# Patient Record
Sex: MALE | Race: WHITE | NOT HISPANIC OR LATINO | URBAN - METROPOLITAN AREA
[De-identification: names, ages, dates, MRNs, and addresses within clinical notes are randomized per-mention and may not be internally consistent; named-entity substitution may affect disease eponyms.]

---

## 2022-11-03 ENCOUNTER — TELEPHONE (OUTPATIENT)
Dept: TRANSPLANT | Facility: CLINIC | Age: 42
End: 2022-11-03

## 2022-11-03 ENCOUNTER — TELEPHONE (OUTPATIENT)
Dept: TRANSPLANT | Facility: CLINIC | Age: 42
End: 2022-11-03
Payer: COMMERCIAL

## 2022-11-03 NOTE — TELEPHONE ENCOUNTER
Referral received from   Referring Provider: Alexi Leonardo MD   Phone: [phone]   Fax: [phone]       Patient with Cirrhosis unspecified  MELD 15  ICD-10:  k74.60   Referred for liver transplant for EVALUATION.    Referral completed and forwarded to [facility].          Insurance: [employer] [facility]   PRIMARY:   ID:YAQ  [id number]  Contact #     SECONDARY:   ID:  Contact #

## 2022-11-03 NOTE — TELEPHONE ENCOUNTER
----- Message from Rosaura Laureano sent at 11/3/2022  3:32 PM CDT -----    Hepatology referral received and scanned into media; pt chart sent to referral nurse for medical review.       Referring Provider: Alexi Leonardo MD   Phone: [phone]   Fax: [phone]        .

## 2022-11-07 ENCOUNTER — PATIENT MESSAGE (OUTPATIENT)
Dept: HEPATOLOGY | Facility: CLINIC | Age: 42
End: 2022-11-07
Payer: COMMERCIAL

## 2022-11-25 DIAGNOSIS — Z76.82 ORGAN TRANSPLANT CANDIDATE: ICD-10-CM

## 2022-11-25 DIAGNOSIS — K74.60 HEPATIC CIRRHOSIS, UNSPECIFIED HEPATIC CIRRHOSIS TYPE, UNSPECIFIED WHETHER ASCITES PRESENT: Primary | ICD-10-CM

## 2022-11-27 RX ORDER — HYDROCODONE BITARTRATE AND ACETAMINOPHEN 7.5; 325 MG/1; MG/1
1 TABLET ORAL EVERY 6 HOURS PRN
COMMUNITY
Start: 2022-11-18 | End: 2022-12-22 | Stop reason: ALTCHOICE

## 2022-11-27 RX ORDER — METFORMIN HYDROCHLORIDE 1000 MG/1
1000 TABLET ORAL 2 TIMES DAILY
COMMUNITY
Start: 2022-11-07

## 2022-11-27 RX ORDER — LACTULOSE 10 G/15ML
30 SOLUTION ORAL; RECTAL 2 TIMES DAILY
COMMUNITY
Start: 2022-11-07 | End: 2023-02-28

## 2022-11-27 RX ORDER — LOSARTAN POTASSIUM 50 MG/1
50 TABLET ORAL EVERY MORNING
COMMUNITY
Start: 2022-11-07

## 2022-11-27 RX ORDER — METOPROLOL TARTRATE 25 MG/1
12.5 TABLET, FILM COATED ORAL 2 TIMES DAILY
COMMUNITY
Start: 2022-11-03

## 2022-11-27 RX ORDER — ESCITALOPRAM OXALATE 20 MG/1
20 TABLET ORAL DAILY
COMMUNITY
Start: 2022-11-07

## 2022-11-27 RX ORDER — RIFAXIMIN 550 MG/1
550 TABLET ORAL 2 TIMES DAILY
COMMUNITY
Start: 2022-11-07

## 2022-11-27 RX ORDER — PANTOPRAZOLE SODIUM 40 MG/1
40 TABLET, DELAYED RELEASE ORAL 2 TIMES DAILY
COMMUNITY
Start: 2022-11-07

## 2022-11-27 RX ORDER — ESOMEPRAZOLE MAGNESIUM 40 MG/1
CAPSULE, DELAYED RELEASE ORAL EVERY MORNING
COMMUNITY
Start: 2022-10-04 | End: 2022-11-28

## 2022-11-27 RX ORDER — ROSUVASTATIN CALCIUM 5 MG/1
5 TABLET, COATED ORAL EVERY MORNING
COMMUNITY
Start: 2022-11-07

## 2022-11-27 RX ORDER — CARVEDILOL 3.12 MG/1
3.12 TABLET ORAL 2 TIMES DAILY
COMMUNITY
Start: 2022-10-24 | End: 2022-11-28

## 2022-11-28 ENCOUNTER — OFFICE VISIT (OUTPATIENT)
Dept: TRANSPLANT | Facility: CLINIC | Age: 42
End: 2022-11-28
Attending: INTERNAL MEDICINE
Payer: COMMERCIAL

## 2022-11-28 ENCOUNTER — LAB VISIT (OUTPATIENT)
Dept: LAB | Facility: HOSPITAL | Age: 42
End: 2022-11-28
Payer: COMMERCIAL

## 2022-11-28 ENCOUNTER — TELEPHONE (OUTPATIENT)
Dept: TRANSPLANT | Facility: CLINIC | Age: 42
End: 2022-11-28

## 2022-11-28 VITALS
TEMPERATURE: 97 F | HEART RATE: 80 BPM | OXYGEN SATURATION: 100 % | WEIGHT: 180.75 LBS | DIASTOLIC BLOOD PRESSURE: 58 MMHG | SYSTOLIC BLOOD PRESSURE: 119 MMHG | BODY MASS INDEX: 30.12 KG/M2 | HEIGHT: 65 IN | RESPIRATION RATE: 16 BRPM

## 2022-11-28 DIAGNOSIS — K75.81 LIVER CIRRHOSIS SECONDARY TO NASH: Chronic | ICD-10-CM

## 2022-11-28 DIAGNOSIS — K74.60 LIVER CIRRHOSIS SECONDARY TO NASH: Chronic | ICD-10-CM

## 2022-11-28 DIAGNOSIS — Z95.828 S/P TIPS (TRANSJUGULAR INTRAHEPATIC PORTOSYSTEMIC SHUNT): Chronic | ICD-10-CM

## 2022-11-28 DIAGNOSIS — K74.60 HEPATIC CIRRHOSIS, UNSPECIFIED HEPATIC CIRRHOSIS TYPE, UNSPECIFIED WHETHER ASCITES PRESENT: ICD-10-CM

## 2022-11-28 DIAGNOSIS — Z76.82 ORGAN TRANSPLANT CANDIDATE: ICD-10-CM

## 2022-11-28 DIAGNOSIS — I85.10 SECONDARY ESOPHAGEAL VARICES WITHOUT BLEEDING: Chronic | ICD-10-CM

## 2022-11-28 DIAGNOSIS — I86.4 GASTRIC VARICES: Chronic | ICD-10-CM

## 2022-11-28 LAB
A1 AG RBC QL: NORMAL
AFP SERPL-MCNC: <2 NG/ML (ref 0–8.4)
ALBUMIN SERPL BCP-MCNC: 3.6 G/DL (ref 3.5–5.2)
ALP SERPL-CCNC: 141 U/L (ref 55–135)
ALT SERPL W/O P-5'-P-CCNC: 13 U/L (ref 10–44)
AMPHET+METHAMPHET UR QL: NEGATIVE
ANION GAP SERPL CALC-SCNC: 8 MMOL/L (ref 8–16)
ANISOCYTOSIS BLD QL SMEAR: SLIGHT
AST SERPL-CCNC: 21 U/L (ref 10–40)
BARBITURATES UR QL SCN>200 NG/ML: NEGATIVE
BASOPHILS NFR BLD: 1 % (ref 0–1.9)
BENZODIAZ UR QL SCN>200 NG/ML: NEGATIVE
BILIRUB DIRECT SERPL-MCNC: 0.7 MG/DL (ref 0.1–0.3)
BILIRUB SERPL-MCNC: 1.6 MG/DL (ref 0.1–1)
BILIRUB UR QL STRIP: NEGATIVE
BUN SERPL-MCNC: 8 MG/DL (ref 6–20)
BZE UR QL SCN: NEGATIVE
CALCIUM SERPL-MCNC: 8.8 MG/DL (ref 8.7–10.5)
CANNABINOIDS UR QL SCN: NEGATIVE
CHLORIDE SERPL-SCNC: 110 MMOL/L (ref 95–110)
CLARITY UR REFRACT.AUTO: CLEAR
CO2 SERPL-SCNC: 25 MMOL/L (ref 23–29)
COLOR UR AUTO: YELLOW
CREAT SERPL-MCNC: 0.8 MG/DL (ref 0.5–1.4)
CREAT UR-MCNC: 132 MG/DL (ref 23–375)
DIFFERENTIAL METHOD: ABNORMAL
EOSINOPHIL NFR BLD: 0 % (ref 0–8)
ERYTHROCYTE [DISTWIDTH] IN BLOOD BY AUTOMATED COUNT: 15.5 % (ref 11.5–14.5)
EST. GFR  (NO RACE VARIABLE): >60 ML/MIN/1.73 M^2
ETHANOL UR-MCNC: <10 MG/DL
GGT SERPL-CCNC: 133 U/L (ref 8–55)
GLUCOSE SERPL-MCNC: 112 MG/DL (ref 70–110)
GLUCOSE UR QL STRIP: NEGATIVE
HAV IGG SER QL IA: REACTIVE
HBV CORE AB SERPL QL IA: NORMAL
HBV SURFACE AB SER-ACNC: <3 MIU/ML
HBV SURFACE AB SER-ACNC: NORMAL M[IU]/ML
HBV SURFACE AG SERPL QL IA: NORMAL
HCT VFR BLD AUTO: 27.1 % (ref 40–54)
HCV AB SERPL QL IA: NORMAL
HGB BLD-MCNC: 8.1 G/DL (ref 14–18)
HGB UR QL STRIP: NEGATIVE
HYPOCHROMIA BLD QL SMEAR: ABNORMAL
IMM GRANULOCYTES # BLD AUTO: ABNORMAL K/UL (ref 0–0.04)
IMM GRANULOCYTES NFR BLD AUTO: ABNORMAL % (ref 0–0.5)
INR PPP: 1.3 (ref 0.8–1.2)
KETONES UR QL STRIP: NEGATIVE
LEUKOCYTE ESTERASE UR QL STRIP: NEGATIVE
LYMPHOCYTES NFR BLD: 18 % (ref 18–48)
MCH RBC QN AUTO: 24.8 PG (ref 27–31)
MCHC RBC AUTO-ENTMCNC: 29.9 G/DL (ref 32–36)
MCV RBC AUTO: 83 FL (ref 82–98)
METHADONE UR QL SCN>300 NG/ML: NEGATIVE
MONOCYTES NFR BLD: 7 % (ref 4–15)
NEUTROPHILS NFR BLD: 74 % (ref 38–73)
NITRITE UR QL STRIP: NEGATIVE
NRBC BLD-RTO: 0 /100 WBC
OPIATES UR QL SCN: NEGATIVE
OVALOCYTES BLD QL SMEAR: ABNORMAL
PCP UR QL SCN>25 NG/ML: NEGATIVE
PH UR STRIP: 6 [PH] (ref 5–8)
PLATELET # BLD AUTO: 63 K/UL (ref 150–450)
PLATELET BLD QL SMEAR: ABNORMAL
PMV BLD AUTO: 11.2 FL (ref 9.2–12.9)
POIKILOCYTOSIS BLD QL SMEAR: SLIGHT
POLYCHROMASIA BLD QL SMEAR: ABNORMAL
POTASSIUM SERPL-SCNC: 4.2 MMOL/L (ref 3.5–5.1)
PROT SERPL-MCNC: 6.8 G/DL (ref 6–8.4)
PROT UR QL STRIP: NEGATIVE
PROTHROMBIN TIME: 13 SEC (ref 9–12.5)
RBC # BLD AUTO: 3.26 M/UL (ref 4.6–6.2)
SODIUM SERPL-SCNC: 143 MMOL/L (ref 136–145)
SP GR UR STRIP: 1.02 (ref 1–1.03)
TOXICOLOGY INFORMATION: NORMAL
URN SPEC COLLECT METH UR: NORMAL
WBC # BLD AUTO: 1.56 K/UL (ref 3.9–12.7)

## 2022-11-28 PROCEDURE — 80053 COMPREHEN METABOLIC PANEL: CPT | Mod: TXP | Performed by: INTERNAL MEDICINE

## 2022-11-28 PROCEDURE — 85027 COMPLETE CBC AUTOMATED: CPT | Mod: TXP | Performed by: INTERNAL MEDICINE

## 2022-11-28 PROCEDURE — 1159F PR MEDICATION LIST DOCUMENTED IN MEDICAL RECORD: ICD-10-PCS | Mod: CPTII,S$GLB,TXP, | Performed by: INTERNAL MEDICINE

## 2022-11-28 PROCEDURE — 3074F SYST BP LT 130 MM HG: CPT | Mod: CPTII,S$GLB,TXP, | Performed by: INTERNAL MEDICINE

## 2022-11-28 PROCEDURE — 82248 BILIRUBIN DIRECT: CPT | Mod: TXP | Performed by: INTERNAL MEDICINE

## 2022-11-28 PROCEDURE — 3078F PR MOST RECENT DIASTOLIC BLOOD PRESSURE < 80 MM HG: ICD-10-PCS | Mod: CPTII,S$GLB,TXP, | Performed by: INTERNAL MEDICINE

## 2022-11-28 PROCEDURE — 1160F PR REVIEW ALL MEDS BY PRESCRIBER/CLIN PHARMACIST DOCUMENTED: ICD-10-PCS | Mod: CPTII,S$GLB,TXP, | Performed by: INTERNAL MEDICINE

## 2022-11-28 PROCEDURE — 86850 RBC ANTIBODY SCREEN: CPT | Mod: 91,TXP | Performed by: INTERNAL MEDICINE

## 2022-11-28 PROCEDURE — 86978 RBC PRETREATMENT SERUM: CPT | Mod: 91,TXP | Performed by: INTERNAL MEDICINE

## 2022-11-28 PROCEDURE — 86905 BLOOD TYPING RBC ANTIGENS: CPT | Mod: TXP | Performed by: INTERNAL MEDICINE

## 2022-11-28 PROCEDURE — 85007 BL SMEAR W/DIFF WBC COUNT: CPT | Mod: TXP | Performed by: INTERNAL MEDICINE

## 2022-11-28 PROCEDURE — 80321 ALCOHOLS BIOMARKERS 1OR 2: CPT | Mod: TXP | Performed by: INTERNAL MEDICINE

## 2022-11-28 PROCEDURE — 4010F ACE/ARB THERAPY RXD/TAKEN: CPT | Mod: CPTII,S$GLB,TXP, | Performed by: INTERNAL MEDICINE

## 2022-11-28 PROCEDURE — 82977 ASSAY OF GGT: CPT | Mod: TXP | Performed by: INTERNAL MEDICINE

## 2022-11-28 PROCEDURE — 99999 PR PBB SHADOW E&M-EST. PATIENT-LVL IV: CPT | Mod: PBBFAC,TXP,, | Performed by: INTERNAL MEDICINE

## 2022-11-28 PROCEDURE — 86790 VIRUS ANTIBODY NOS: CPT | Mod: TXP | Performed by: INTERNAL MEDICINE

## 2022-11-28 PROCEDURE — 82105 ALPHA-FETOPROTEIN SERUM: CPT | Mod: TXP | Performed by: INTERNAL MEDICINE

## 2022-11-28 PROCEDURE — 1159F MED LIST DOCD IN RCRD: CPT | Mod: CPTII,S$GLB,TXP, | Performed by: INTERNAL MEDICINE

## 2022-11-28 PROCEDURE — 3008F PR BODY MASS INDEX (BMI) DOCUMENTED: ICD-10-PCS | Mod: CPTII,S$GLB,TXP, | Performed by: INTERNAL MEDICINE

## 2022-11-28 PROCEDURE — 86803 HEPATITIS C AB TEST: CPT | Mod: TXP | Performed by: INTERNAL MEDICINE

## 2022-11-28 PROCEDURE — 86901 BLOOD TYPING SEROLOGIC RH(D): CPT | Mod: TXP | Performed by: INTERNAL MEDICINE

## 2022-11-28 PROCEDURE — 36415 COLL VENOUS BLD VENIPUNCTURE: CPT | Mod: TXP | Performed by: INTERNAL MEDICINE

## 2022-11-28 PROCEDURE — 85610 PROTHROMBIN TIME: CPT | Mod: TXP | Performed by: INTERNAL MEDICINE

## 2022-11-28 PROCEDURE — 3008F BODY MASS INDEX DOCD: CPT | Mod: CPTII,S$GLB,TXP, | Performed by: INTERNAL MEDICINE

## 2022-11-28 PROCEDURE — 99204 PR OFFICE/OUTPT VISIT, NEW, LEVL IV, 45-59 MIN: ICD-10-PCS | Mod: S$GLB,TXP,, | Performed by: INTERNAL MEDICINE

## 2022-11-28 PROCEDURE — 80307 DRUG TEST PRSMV CHEM ANLYZR: CPT | Mod: TXP | Performed by: INTERNAL MEDICINE

## 2022-11-28 PROCEDURE — 1160F RVW MEDS BY RX/DR IN RCRD: CPT | Mod: CPTII,S$GLB,TXP, | Performed by: INTERNAL MEDICINE

## 2022-11-28 PROCEDURE — 86704 HEP B CORE ANTIBODY TOTAL: CPT | Mod: TXP | Performed by: INTERNAL MEDICINE

## 2022-11-28 PROCEDURE — 3074F PR MOST RECENT SYSTOLIC BLOOD PRESSURE < 130 MM HG: ICD-10-PCS | Mod: CPTII,S$GLB,TXP, | Performed by: INTERNAL MEDICINE

## 2022-11-28 PROCEDURE — 81003 URINALYSIS AUTO W/O SCOPE: CPT | Mod: TXP | Performed by: INTERNAL MEDICINE

## 2022-11-28 PROCEDURE — 87340 HEPATITIS B SURFACE AG IA: CPT | Mod: TXP | Performed by: INTERNAL MEDICINE

## 2022-11-28 PROCEDURE — 86706 HEP B SURFACE ANTIBODY: CPT | Mod: 91,TXP | Performed by: INTERNAL MEDICINE

## 2022-11-28 PROCEDURE — 99204 OFFICE O/P NEW MOD 45 MIN: CPT | Mod: S$GLB,TXP,, | Performed by: INTERNAL MEDICINE

## 2022-11-28 PROCEDURE — 3078F DIAST BP <80 MM HG: CPT | Mod: CPTII,S$GLB,TXP, | Performed by: INTERNAL MEDICINE

## 2022-11-28 PROCEDURE — 99999 PR PBB SHADOW E&M-EST. PATIENT-LVL IV: ICD-10-PCS | Mod: PBBFAC,TXP,, | Performed by: INTERNAL MEDICINE

## 2022-11-28 PROCEDURE — 4010F PR ACE/ARB THEARPY RXD/TAKEN: ICD-10-PCS | Mod: CPTII,S$GLB,TXP, | Performed by: INTERNAL MEDICINE

## 2022-11-28 PROCEDURE — 86870 RBC ANTIBODY IDENTIFICATION: CPT | Mod: TXP | Performed by: INTERNAL MEDICINE

## 2022-11-28 NOTE — LETTER
November 28, 2022        Alexi Leonardo  [address] [place] [address] [place] [address]  Phone: [phone]  Fax: [phone]             [facility]  [address]  [facility] [address]  Phone: [phone]   Patient: Michael Urbano   MR Number: [id number]   YOB: 1980   Date of Visit: 11/28/2022       Dear Dr. Alexi Leonardo    Thank you for referring Michael Urbano to me for evaluation. Attached you will find relevant portions of my assessment and plan of care.    If you have questions, please do not hesitate to call me. I look forward to following Michael Urbano along with you.    Sincerely,    Travis Rivas MD    Enclosure    If you would like to receive this communication electronically, please contact [email or web address] or [phone] to request [employer] Link access.    [employer] is a tool which provides read-only access to select patient information with whom you have a relationship. Its easy to use and provides real time access to review your patients record including encounter summaries, notes, results, and demographic information.    If you feel you have received this communication in error or would no longer like to receive these types of communications, please e-mail [email or web address]

## 2022-11-28 NOTE — PROGRESS NOTES
Transplant Hepatology  Liver Transplant Recipient Evaluation      Consultation started: 11/28/2022 at 9:57 AM     Original Referring Provider: Alexi Leonardo  Current Corresponding Physician: Alexi SMILEY Native Liver Diagnosis: Cirrhosis: Other, Specify - unspecified    Reason for Visit: evaluation for liver transplant     Subjective:     Michael Urbano is a 42 y.o. 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  He states that he is had a longstanding history of fatty liver disease with risk factors including type 2 diabetes as well as being overweight.  He also has a history of hyper lipidemia.  Several months ago he was being evaluated for cytopenias.  This led to a number of investigations including imaging showed features of portal hypertension.  An upper endoscopy in September showed large esophageal and gastric varices.  In October he presented with a shock producing variceal bleed requiring a 5 day admission and a tips shunt insertion.  The procedure was well tolerated.  He is on both lactulose and Xifaxan but has no clinical symptoms of encephalopathy.  Subsequently developed symptomatic cholelithiasis and had a laparoscopic cholecystectomy.  He has no history of coronary artery disease.  He has a history of hypertension but no other comorbidities.  He is accompanied today by his wife.  He continues to work full-time.  He is 3 children age range 25-12.  His father was transplanted recently at [facility].    Review of Systems   Constitutional:  Negative for activity change, appetite change, chills, fatigue and unexpected weight change.   HENT:  Negative for congestion, facial swelling and tinnitus.    Eyes:  Negative for visual disturbance.   Respiratory:  Negative for cough, shortness of breath and wheezing.    Cardiovascular:  Negative for chest pain and palpitations.   Gastrointestinal:  Negative for abdominal distention.   Genitourinary:  Negative  for dysuria.   Musculoskeletal:  Negative for arthralgias, joint swelling and myalgias.   Neurological:  Negative for syncope and headaches.   Hematological:  Does not bruise/bleed easily.   Psychiatric/Behavioral:  Negative for confusion.    Past Surgical History:   Procedure Laterality Date    COLONOSCOPY  09/07/2022    ESOPHAGOGASTRODUODENOSCOPY  09/07/2022    FRACTURE SURGERY      LEFT ARM    IR TIPS      10/9/2022    LAPAROSCOPIC CHOLECYSTECTOMY  11/18/2022     Current Outpatient Medications   Medication Sig    EScitalopram oxalate (LEXAPRO) 20 MG tablet Take 20 mg by mouth once daily.    HYDROcodone-acetaminophen (NORCO) 7.5-325 mg per tablet Take 1 tablet by mouth every 6 (six) hours as needed.    lactulose (CHRONULAC) 10 gram/15 mL solution Take 30 mLs by mouth 2 (two) times daily.    losartan (COZAAR) 50 MG tablet Take 50 mg by mouth every morning.    metFORMIN (GLUCOPHAGE) 1000 MG tablet Take 1,000 mg by mouth 2 (two) times daily.    metoprolol tartrate (LOPRESSOR) 25 MG tablet Take 12.5 mg by mouth 2 (two) times daily.    pantoprazole (PROTONIX) 40 MG tablet Take 40 mg by mouth 2 (two) times daily.    rosuvastatin (CRESTOR) 5 MG tablet Take 5 mg by mouth every morning.    XIFAXAN 550 mg Tab Take 550 mg by mouth 2 (two) times daily.     No current facility-administered medications for this visit.       Objective:   Physical Exam  Constitutional:       Appearance: He is well-developed.   Eyes:      General: No scleral icterus.  Cardiovascular:      Rate and Rhythm: Normal rate and regular rhythm.      Heart sounds: Normal heart sounds.   Pulmonary:      Effort: Pulmonary effort is normal. No respiratory distress.      Breath sounds: Normal breath sounds. No wheezing.   Abdominal:      General: Bowel sounds are normal. There is no distension.      Palpations: Abdomen is soft. There is no mass.      Tenderness: There is no abdominal tenderness. There is no rebound.   Musculoskeletal:         General: Normal  range of motion.   Lymphadenopathy:      Cervical: No cervical adenopathy.   Skin:     General: Skin is warm and dry.   Neurological:      Mental Status: He is alert and oriented to person, place, and time.     Computed MELD-Na score unavailable. Necessary lab results were not found in the last year.  Computed MELD score unavailable. Necessary lab results were not found in the last year.  No results found for: GLU, BUN, CREATININE, CALCIUM, NA, K, CL, PROT, CO2, WBC, RBC, HGB, HCT, PLT  No results found for: DIANA, BNP, CHOL, TRIG, HDL, LDL, CHOLHDL, TOTALCHOLEST, ALBUMIN, BILITOT, AST, ALT, ALKPHOS, MG, LABPROT, INR, PSA, QUANTIFERON    Diagnostics:     1. Hepatic cirrhosis, unspecified hepatic cirrhosis type, unspecified whether ascites present    2. Organ transplant candidate    3. Liver cirrhosis secondary to AMEZCUA    4. S/P TIPS (transjugular intrahepatic portosystemic shunt)    5. Secondary esophageal varices without bleeding    6. Gastric varices        Transplant Hepatology - Candidacy   Assessment/Plan:     Transplant Candidacy: Michael Urbano is a 42 y.o. male with ESLD secondary to nonalcoholic steatohepatitis here for evaluation for possible OLT.  In my opinion, he is a good candidate for liver transplant.  He will be presented to selection committee after all tests and evaluations are complete.    Patient advised that it is recommended that all transplant candidates, and their close contacts and household members receive Covid vaccination.    Travis Rivas MD         [facility] Patient Status  Functional Status: 90% - Able to carry on normal activity: minor symptoms of disease  Physical Capacity: No Limitations    Patient on life support: No  Diabetes: Type II  Any previous malignancy: No  Neoadjuvant Therapy: no  Has patient ever had a dx of HCC: yes  Previous Abdominal Surgery: no  Spontaneous Bacterial Peritonitis: no  History of Portal Vein Thrombosis: no  Transjugular Intrahepatic Portosystemic Shunt:  yes

## 2022-11-29 LAB
ABO + RH BLD: ABNORMAL
BLD GP AB SCN CELLS X3 SERPL QL: ABNORMAL

## 2022-11-30 ENCOUNTER — TELEPHONE (OUTPATIENT)
Dept: TRANSPLANT | Facility: CLINIC | Age: 42
End: 2022-11-30
Payer: COMMERCIAL

## 2022-11-30 LAB
BLD GP AB SCN CELLS X3 SERPL QL: ABNORMAL
BLD GP AB SCN CELLS X3 SERPL QL: ABNORMAL
BLOOD GROUP ANTIBODIES SERPL: NORMAL

## 2022-11-30 NOTE — TELEPHONE ENCOUNTER
----- Message from Travis Rivas MD sent at 11/28/2022  9:46 AM CST -----  Please start transplant evaluation

## 2022-12-01 LAB
CLINICAL BIOCHEMIST REVIEW: NORMAL
PLPETH BLD-MCNC: NORMAL NG/ML
POPETH BLD-MCNC: NORMAL NG/ML

## 2022-12-06 ENCOUNTER — TELEPHONE (OUTPATIENT)
Dept: TRANSPLANT | Facility: CLINIC | Age: 42
End: 2022-12-06
Payer: COMMERCIAL

## 2022-12-06 DIAGNOSIS — K74.60 LIVER CIRRHOSIS SECONDARY TO NASH: Primary | Chronic | ICD-10-CM

## 2022-12-06 DIAGNOSIS — Z76.82 ORGAN TRANSPLANT CANDIDATE: ICD-10-CM

## 2022-12-06 DIAGNOSIS — Z95.828 S/P TIPS (TRANSJUGULAR INTRAHEPATIC PORTOSYSTEMIC SHUNT): Chronic | ICD-10-CM

## 2022-12-06 DIAGNOSIS — K75.81 LIVER CIRRHOSIS SECONDARY TO NASH: Primary | Chronic | ICD-10-CM

## 2022-12-06 DIAGNOSIS — I85.10 SECONDARY ESOPHAGEAL VARICES WITHOUT BLEEDING: Chronic | ICD-10-CM

## 2022-12-06 DIAGNOSIS — I86.4 GASTRIC VARICES: Chronic | ICD-10-CM

## 2022-12-06 NOTE — TELEPHONE ENCOUNTER
Called pt to discuss liver transplant evaluation.  Informed patient that evaluation will take approx 2 days to complete.  Informed him that all testing will be done outpatient.  Patient voiced understanding of the need to have his caregiver present for the  and surgeon consult, as well as for the patient education.  All questions/ concerns regarding liver transplant evaluation answered/ addressed.  Orders for liver transplant evaluation entered and submitted to  for scheduling.  Per patient's request, will schedule appts 12/21 and 12/22.

## 2022-12-07 ENCOUNTER — PATIENT MESSAGE (OUTPATIENT)
Dept: TRANSPLANT | Facility: CLINIC | Age: 42
End: 2022-12-07
Payer: COMMERCIAL

## 2022-12-08 ENCOUNTER — PATIENT MESSAGE (OUTPATIENT)
Dept: TRANSPLANT | Facility: CLINIC | Age: 42
End: 2022-12-08
Payer: COMMERCIAL

## 2022-12-08 DIAGNOSIS — K74.60 LIVER CIRRHOSIS SECONDARY TO NASH: Primary | ICD-10-CM

## 2022-12-08 DIAGNOSIS — K75.81 LIVER CIRRHOSIS SECONDARY TO NASH: Primary | ICD-10-CM

## 2022-12-09 RX ORDER — FUROSEMIDE 20 MG/1
20 TABLET ORAL DAILY
Qty: 30 TABLET | Refills: 6 | Status: SHIPPED | OUTPATIENT
Start: 2022-12-09 | End: 2022-12-22 | Stop reason: ALTCHOICE

## 2022-12-09 RX ORDER — SPIRONOLACTONE 50 MG/1
50 TABLET, FILM COATED ORAL DAILY
Qty: 30 TABLET | Refills: 11 | Status: SHIPPED | OUTPATIENT
Start: 2022-12-09 | End: 2022-12-22 | Stop reason: ALTCHOICE

## 2022-12-10 ENCOUNTER — HOSPITAL ENCOUNTER (OUTPATIENT)
Dept: RADIOLOGY | Facility: HOSPITAL | Age: 42
Discharge: HOME | End: 2022-12-10
Attending: INTERNAL MEDICINE
Payer: COMMERCIAL

## 2022-12-10 DIAGNOSIS — Z76.82 ORGAN TRANSPLANT CANDIDATE: ICD-10-CM

## 2022-12-10 DIAGNOSIS — I85.10 SECONDARY ESOPHAGEAL VARICES WITHOUT BLEEDING: ICD-10-CM

## 2022-12-10 DIAGNOSIS — I86.4 GASTRIC VARICES: ICD-10-CM

## 2022-12-10 DIAGNOSIS — Z95.828 S/P TIPS (TRANSJUGULAR INTRAHEPATIC PORTOSYSTEMIC SHUNT): ICD-10-CM

## 2022-12-10 DIAGNOSIS — K74.60 LIVER CIRRHOSIS SECONDARY TO NASH: ICD-10-CM

## 2022-12-10 DIAGNOSIS — K75.81 LIVER CIRRHOSIS SECONDARY TO NASH: ICD-10-CM

## 2022-12-10 PROCEDURE — 77080 DEXA BONE DENSITY SPINE HIP: ICD-10-PCS | Mod: 26,TXP,, | Performed by: RADIOLOGY

## 2022-12-10 PROCEDURE — 77080 DXA BONE DENSITY AXIAL: CPT | Mod: 26,TXP,, | Performed by: RADIOLOGY

## 2022-12-10 PROCEDURE — 77080 DXA BONE DENSITY AXIAL: CPT | Mod: TC,TXP

## 2022-12-17 PROBLEM — Z76.82 ORGAN TRANSPLANT CANDIDATE: Status: ACTIVE | Noted: 2022-12-17

## 2022-12-17 PROBLEM — Z01.818 PRE-TRANSPLANT EVALUATION FOR CHRONIC LIVER DISEASE: Status: ACTIVE | Noted: 2022-12-17

## 2022-12-17 PROBLEM — Z71.85 IMMUNIZATION COUNSELING: Status: ACTIVE | Noted: 2022-12-17

## 2022-12-18 NOTE — PROGRESS NOTES
PRE-TRANSPLANT INFECTIOUS DISEASE CONSULT    Reason for Visit:  Pre-transplant evaluation  Referring Provider: Dr. Travis Rivas     History of Present Illness:    42 y.o. male with a history of end-stage liver cirrhosis d/t AMEZCUA, presents for pre-liver transplant evaluation.    Infectious History:  Recent hospital admissions: Yes, esophageal varices rupture, underwent TIPS procedure, and gall bladder removed 11/18/22 d/t presence of stones.  Recent infections: No  Recent or current antibiotic use: Yes; Rifaxamin 500mg PO BID, end-date pending liver transplant.  History of recurrent infections *(sinus / pneumonia / UTI / SBP)*: No  Recent dental infections, issues or procedures: No  History of chicken pox or shingles: Yes, chicken pox  History of STI: No  History of COVID infection: Yes, 2020    History of Immunosuppression:  Prior chemotherapy / immunosuppression: No  Prior transplant: No  History of splenectomy: No    Tuberculosis:  Prior screening for latent TB: Yes  Prior diagnosis of latent TB: No  Risk factors for TB *known exposure, incarceration, homelessness*: No    Geographical exposures:  Currently lives in [place] with wife, 3 kids.  Lived in the following states: [place]  Lived in [facility] [place]: No  International travel: No  Travel-associated illness: No    Social/Environmental:  Occupation:  Administration position for [employer]  Pets: Yes, cat, w/ litter box.  Livestock: Yes, chickens  Fishing / hunting: No  Hobbies: walking  Water: [place]  Consumption of raw/undercooked meat or seafood?  No  Tobacco: No  Alcohol: No  Recreational drug use:  No  Sexual partners:  to wife, 20yrs      Past Histories:   Past Medical History:   Diagnosis Date    Anxiety and depression     Benign hypertension     Cirrhosis of liver without ascites     DEANDRE on CPAP     Type 2 diabetes mellitus without complication, without long-term current use of insulin      Past Surgical History:   Procedure Laterality Date    COLONOSCOPY   09/07/2022    ESOPHAGOGASTRODUODENOSCOPY  09/07/2022    FRACTURE SURGERY      LEFT ARM    IR TIPS      10/9/2022    LAPAROSCOPIC CHOLECYSTECTOMY  11/18/2022     Family History   Problem Relation Age of Onset    Diabetes Father      Social History     Tobacco Use    Smoking status: Never    Smokeless tobacco: Never     Review of patient's allergies indicates:   Allergen Reactions    Penicillins Hives and Other (See Comments)     as a child         Immunization History:  Received all childhood vaccines: Yes  All household members receive annual flu vaccine: No  All household members are up to date on COVID vaccine: Yes    Immunization History   Administered Date(s) Administered    Hepatitis B (recombinant) Adjuvanted, 2 dose 10/24/2022    Influenza 01/09/2018    MMR 01/24/1981, 04/16/1999    Td (ADULT) 04/16/1999          Current antibiotics:  Antibiotics (From admission, onward)      None              Review of Systems  Review of Systems   Constitutional: Positive for malaise/fatigue. Negative for chills, diaphoresis, fever and night sweats.   HENT:  Negative for sore throat.    Eyes:  Negative for pain and visual disturbance.   Cardiovascular:  Negative for chest pain.   Respiratory:  Negative for cough and shortness of breath.    Skin:  Negative for color change and rash.   Musculoskeletal:  Negative for arthritis, joint pain, joint swelling and myalgias.   Gastrointestinal:  Negative for abdominal pain, constipation, diarrhea, dysphagia, hematochezia, jaundice, nausea and vomiting.   Genitourinary:  Negative for dysuria, flank pain, hematuria and urgency.   Neurological:  Negative for dizziness, headaches, seizures and weakness.   Psychiatric/Behavioral:  Negative for altered mental status and substance abuse.    Allergic/Immunologic: Negative for HIV exposure and persistent infections.        Objective  Physical Exam  Constitutional:       General: He is not in acute distress.     Appearance: Normal appearance.  He is not ill-appearing, toxic-appearing or diaphoretic.   HENT:      Head: Normocephalic and atraumatic.      Mouth/Throat:      Mouth: Mucous membranes are moist.      Pharynx: Oropharynx is clear.   Eyes:      General: No scleral icterus.     Conjunctiva/sclera: Conjunctivae normal.   Cardiovascular:      Rate and Rhythm: Normal rate and regular rhythm.      Pulses: Normal pulses.      Heart sounds: Normal heart sounds.   Pulmonary:      Effort: Pulmonary effort is normal.      Breath sounds: Normal breath sounds.   Abdominal:      General: Bowel sounds are normal. There is no distension.      Palpations: Abdomen is soft.      Tenderness: There is no abdominal tenderness. There is no right CVA tenderness or left CVA tenderness.   Musculoskeletal:         General: No swelling or tenderness. Normal range of motion.      Cervical back: Normal range of motion. No rigidity or tenderness.      Right lower leg: No edema.      Left lower leg: No edema.   Lymphadenopathy:      Cervical: No cervical adenopathy.   Skin:     General: Skin is warm and dry.      Findings: No erythema or rash.   Neurological:      Mental Status: He is alert and oriented to person, place, and time. Mental status is at baseline.   Psychiatric:         Behavior: Behavior normal.         Thought Content: Thought content normal.         MELD: *liver transplant only*  MELD-Na score: 11 at 11/28/2022  9:15 AM  MELD score: 11 at 11/28/2022  9:15 AM  Calculated from:  Serum Creatinine: 0.8 mg/dL (Using min of 1 mg/dL) at 11/28/2022  9:15 AM  Serum Sodium: 143 mmol/L (Using max of 137 mmol/L) at 11/28/2022  9:15 AM  Total Bilirubin: 1.6 mg/dL at 11/28/2022  9:15 AM  INR(ratio): 1.3 at 11/28/2022  9:15 AM  Age: 42 years      Labs:    CBC:   Lab Results   Component Value Date    WBC 1.56 (LL) 11/28/2022    HGB 8.1 (L) 11/28/2022    HCT 27.1 (L) 11/28/2022    MCV 83 11/28/2022    PLT 63 (L) 11/28/2022    GRAN 74.0 (H) 11/28/2022    LYMPH 18.0 11/28/2022     MONO 7.0 11/28/2022    EOSINOPHIL 0.0 11/28/2022       CMP:   Lab Results   Component Value Date     11/28/2022    K 4.2 11/28/2022     11/28/2022    CO2 25 11/28/2022     (H) 11/28/2022    BUN 8 11/28/2022    CREATININE 0.8 11/28/2022    CALCIUM 8.8 11/28/2022    ALBUMIN 3.6 11/28/2022    PROT 6.8 11/28/2022    ALT 13 11/28/2022    AST 21 11/28/2022     (H) 11/28/2022    ALKPHOS 141 (H) 11/28/2022    BILITOT 1.6 (H) 11/28/2022       Syphilis screening: No results found for: RPR, PRPQ, FTAABS     TB screening: No results found for: TBGOLDPLUS, TSPOTSCREN    HIV screening: No results found for: CVE44VLYV    Strongyloides IgG: No results found for: STRONGANTIGG    Hepatitis Serologies:   Lab Results   Component Value Date    HEPAIGG Reactive 11/28/2022    HEPBCAB Non-reactive 11/28/2022    HEPBSAB <3.00 11/28/2022    HEPBSAB Non-reactive 11/28/2022    HEPCAB Non-reactive 11/28/2022        Varicella IgG: No results found for: VARICELLAINT    Recent Microbiology:   Microbiology Results (last 7 days)       ** No results found for the last 168 hours. **            Radiology:          Assessment and Plan    1. Risks of Infection: Available serologies were reviewed. No unusual risks of infection or significant barriers to transplantation were identified from the infectious disease standpoint.   - Pending serologies: Hepatitis A Ab, HIV, Quantiferon gold / T-spot, RPR, Strongyloides IgG, and VZV IgG    2. Immunizations:  Based on the patient's immunization history and serologies, the following immunizations are recommended:       -- Hepatitis A     Patient does have immunity to hepatitis A    Vaccination required: No      -- Hepatitis B    Patient does not have immunity to hepatitis B    Vaccination ordered today: Yes    If not ordered, reason for not vaccinating: n/a     -- COVID - due for series / boosters.    Current CDC vaccination recommendations were discussed with the patient     -- Influenza  - due    Annual, high dose preferred     -- Prevnar 20     -- Tdap - due     -- Shingrix - due     Immunizations Due:  Tdap, Flu, covid series w/ updated boosters, PCV20, HBV, shingrix   Completed today in clinic: None, deferred at this time.       Recommended Pre-Transplant Immunization Schedule  Vaccine  0m 1m 2m 6m    IMM.188 - Pneumococcal conjugate vaccine (Prevnar 20) X       IMM.61 - Tetanus-diphtheria-pertussis (Tdap)* X       IMM.24 - Hepatitis A Vaccine (Havrix)**   X   X    IMM.171 - Hepatitis B Vaccine (Heplisav)** X X      IMM.180 - Influenza  X       IMM88 - Zoster Recombinant Vaccine (Shingrix) X  X           *Administer booster every 10 years.       **Administer if no immunity demonstrated on serologies                 3. Counseling:   I discussed with the patient the risk for increased susceptibility to infections following transplantation including increased risk for infection right after transplant and if rejection should occur.  The patient has been counseled on the importance of vaccinations including but not limited to a yearly flu vaccine. Patient was also instructed to encourage that family/caretakers receive their flu vaccine yearly. The patient was encouraged to contact us about any problems that may develop after immunizations and possible side effects were reviewed.     Specific guidance has been provided to the patient regarding the patient's occupation, hobbies and activities to avoid future infectious complications. These include but are not limited to: avoiding raw/undercooked meats and seafood, avoiding unpasteurized milk/cheeses, proper (hand) hygiene, contact with animals and appropriate vaccination of animals, use of mosquito/tick precautions, avoiding walking barefoot, avoiding sick contacts, and seeking medical advice prior to foreign travel (specifically developing countries).     4. Transplant Candidacy: Based on available information, there are no identified significant  barriers to transplantation from an infectious disease standpoint.  Final determination of transplant candidacy will be made once evaluation is complete and reviewed by the Selection Committee.      Follow up with infectious disease as needed. Please call if any pending serologic testing is positive.      The total time for evaluation and management services performed on 12/21/22 was greater than 30 minutes.

## 2022-12-20 ENCOUNTER — TELEPHONE (OUTPATIENT)
Dept: TRANSPLANT | Facility: CLINIC | Age: 42
End: 2022-12-20
Payer: COMMERCIAL

## 2022-12-20 ENCOUNTER — PATIENT MESSAGE (OUTPATIENT)
Dept: TRANSPLANT | Facility: CLINIC | Age: 42
End: 2022-12-20
Payer: COMMERCIAL

## 2022-12-20 NOTE — TELEPHONE ENCOUNTER
Called pt in regards to Fast Pass Liver Transplant Evaluation scheduled to begin on tomorrow.  Patient confirms that he will be able to keep appointments as scheduled.  Discussed all appts times and locations.  Patient voiced understanding of the need to have his caregiver present for the  and surgeon consult, as well as for the patient education. Informed her of all special instructions, including the need to fast for labs, u/s of abd, and stress test.  Also informed him that beta blocker (lopressor) needs to be held starting now.  Understanding expressed.  No questions/ concerns regarding liver transplant evaluation noted.

## 2022-12-21 ENCOUNTER — OFFICE VISIT (OUTPATIENT)
Dept: INFECTIOUS DISEASES | Facility: CLINIC | Age: 42
End: 2022-12-21
Attending: INTERNAL MEDICINE
Payer: COMMERCIAL

## 2022-12-21 ENCOUNTER — HOSPITAL ENCOUNTER (OUTPATIENT)
Dept: CARDIOLOGY | Facility: HOSPITAL | Age: 42
Discharge: HOME | End: 2022-12-21
Attending: INTERNAL MEDICINE
Payer: COMMERCIAL

## 2022-12-21 ENCOUNTER — HOSPITAL ENCOUNTER (OUTPATIENT)
Dept: RADIOLOGY | Facility: HOSPITAL | Age: 42
Discharge: HOME | End: 2022-12-21
Attending: INTERNAL MEDICINE
Payer: COMMERCIAL

## 2022-12-21 VITALS
WEIGHT: 175.06 LBS | HEIGHT: 65 IN | DIASTOLIC BLOOD PRESSURE: 73 MMHG | HEART RATE: 104 BPM | SYSTOLIC BLOOD PRESSURE: 124 MMHG | TEMPERATURE: 99 F | BODY MASS INDEX: 29.17 KG/M2

## 2022-12-21 VITALS
DIASTOLIC BLOOD PRESSURE: 64 MMHG | RESPIRATION RATE: 16 BRPM | BODY MASS INDEX: 29.99 KG/M2 | HEIGHT: 65 IN | WEIGHT: 180 LBS | SYSTOLIC BLOOD PRESSURE: 122 MMHG | HEART RATE: 90 BPM

## 2022-12-21 DIAGNOSIS — Z76.82 ORGAN TRANSPLANT CANDIDATE: Primary | ICD-10-CM

## 2022-12-21 DIAGNOSIS — K75.81 LIVER CIRRHOSIS SECONDARY TO NASH: Chronic | ICD-10-CM

## 2022-12-21 DIAGNOSIS — K75.81 LIVER CIRRHOSIS SECONDARY TO NASH: ICD-10-CM

## 2022-12-21 DIAGNOSIS — I86.4 GASTRIC VARICES: ICD-10-CM

## 2022-12-21 DIAGNOSIS — Z76.82 ORGAN TRANSPLANT CANDIDATE: ICD-10-CM

## 2022-12-21 DIAGNOSIS — Z95.828 S/P TIPS (TRANSJUGULAR INTRAHEPATIC PORTOSYSTEMIC SHUNT): ICD-10-CM

## 2022-12-21 DIAGNOSIS — K74.60 LIVER CIRRHOSIS SECONDARY TO NASH: ICD-10-CM

## 2022-12-21 DIAGNOSIS — I85.10 SECONDARY ESOPHAGEAL VARICES WITHOUT BLEEDING: ICD-10-CM

## 2022-12-21 DIAGNOSIS — I85.10 SECONDARY ESOPHAGEAL VARICES WITHOUT BLEEDING: Chronic | ICD-10-CM

## 2022-12-21 DIAGNOSIS — K74.60 LIVER CIRRHOSIS SECONDARY TO NASH: Chronic | ICD-10-CM

## 2022-12-21 DIAGNOSIS — I86.4 GASTRIC VARICES: Chronic | ICD-10-CM

## 2022-12-21 DIAGNOSIS — Z01.818 PRE-TRANSPLANT EVALUATION FOR CHRONIC LIVER DISEASE: ICD-10-CM

## 2022-12-21 DIAGNOSIS — Z95.828 S/P TIPS (TRANSJUGULAR INTRAHEPATIC PORTOSYSTEMIC SHUNT): Chronic | ICD-10-CM

## 2022-12-21 DIAGNOSIS — Z71.85 IMMUNIZATION COUNSELING: ICD-10-CM

## 2022-12-21 LAB
ASCENDING AORTA: 2.9 CM
AV INDEX (PROSTH): 0.92
AV MEAN GRADIENT: 6 MMHG
AV PEAK GRADIENT: 13 MMHG
AV VALVE AREA: 3.14 CM2
AV VELOCITY RATIO: 0.98
BSA FOR ECHO PROCEDURE: 1.93 M2
CV ECHO LV RWT: 0.43 CM
CV STRESS BASE HR: 90 BPM
DIASTOLIC BLOOD PRESSURE: 70 MMHG
DOP CALC AO PEAK VEL: 1.8 M/S
DOP CALC AO VTI: 25.25 CM
DOP CALC LVOT AREA: 3.4 CM2
DOP CALC LVOT DIAMETER: 2.09 CM
DOP CALC LVOT PEAK VEL: 1.76 M/S
DOP CALC LVOT STROKE VOLUME: 79.31 CM3
DOP CALCLVOT PEAK VEL VTI: 23.13 CM
E WAVE DECELERATION TIME: 165.57 MSEC
E/A RATIO: 0.99
E/E' RATIO: 8.86 M/S
ECHO LV POSTERIOR WALL: 0.89 CM (ref 0.6–1.1)
EJECTION FRACTION: 65 %
FRACTIONAL SHORTENING: 42 % (ref 28–44)
INTERVENTRICULAR SEPTUM: 0.86 CM (ref 0.6–1.1)
IVRT: 102.76 MSEC
LA MAJOR: 5.12 CM
LA MINOR: 4.88 CM
LA WIDTH: 3.75 CM
LEFT ATRIUM SIZE: 3.41 CM
LEFT ATRIUM VOLUME INDEX: 28.7 ML/M2
LEFT ATRIUM VOLUME: 54.32 CM3
LEFT INTERNAL DIMENSION IN SYSTOLE: 2.42 CM (ref 2.1–4)
LEFT VENTRICLE DIASTOLIC VOLUME INDEX: 40.65 ML/M2
LEFT VENTRICLE DIASTOLIC VOLUME: 76.83 ML
LEFT VENTRICLE MASS INDEX: 59 G/M2
LEFT VENTRICLE SYSTOLIC VOLUME INDEX: 10.8 ML/M2
LEFT VENTRICLE SYSTOLIC VOLUME: 20.49 ML
LEFT VENTRICULAR INTERNAL DIMENSION IN DIASTOLE: 4.16 CM (ref 3.5–6)
LEFT VENTRICULAR MASS: 112.45 G
LV LATERAL E/E' RATIO: 7.15 M/S
LV SEPTAL E/E' RATIO: 11.63 M/S
MV A" WAVE DURATION": 9.13 MSEC
MV PEAK A VEL: 0.94 M/S
MV PEAK E VEL: 0.93 M/S
MV STENOSIS PRESSURE HALF TIME: 48.02 MS
MV VALVE AREA P 1/2 METHOD: 4.58 CM2
OHS CV CPX 1 MINUTE RECOVERY HEART RATE: 144 BPM
OHS CV CPX 85 PERCENT MAX PREDICTED HEART RATE MALE: 150
OHS CV CPX MAX PREDICTED HEART RATE: 177
OHS CV CPX PATIENT IS FEMALE: 0
OHS CV CPX PATIENT IS MALE: 1
OHS CV CPX PEAK DIASTOLIC BLOOD PRESSURE: 55 MMHG
OHS CV CPX PEAK HEAR RATE: 153 BPM
OHS CV CPX PEAK RATE PRESSURE PRODUCT: NORMAL
OHS CV CPX PEAK SYSTOLIC BLOOD PRESSURE: 162 MMHG
OHS CV CPX PERCENT MAX PREDICTED HEART RATE ACHIEVED: 86
OHS CV CPX RATE PRESSURE PRODUCT PRESENTING: NORMAL
PISA TR MAX VEL: 2.48 M/S
PULM VEIN S/D RATIO: 1.35
PV PEAK D VEL: 0.48 M/S
PV PEAK S VEL: 0.65 M/S
RA MAJOR: 4.94 CM
RA PRESSURE: 3 MMHG
RA WIDTH: 2.95 CM
RIGHT VENTRICULAR END-DIASTOLIC DIMENSION: 2.96 CM
RV TISSUE DOPPLER FREE WALL SYSTOLIC VELOCITY 1 (APICAL 4 CHAMBER VIEW): 13.29 CM/S
SINUS: 3.55 CM
STJ: 2.64 CM
SYSTOLIC BLOOD PRESSURE: 135 MMHG
TDI LATERAL: 0.13 M/S
TDI SEPTAL: 0.08 M/S
TDI: 0.11 M/S
TR MAX PG: 25 MMHG
TRICUSPID ANNULAR PLANE SYSTOLIC EXCURSION: 2.59 CM
TV REST PULMONARY ARTERY PRESSURE: 28 MMHG

## 2022-12-21 PROCEDURE — 93320 STRESS ECHO (CUPID ONLY): ICD-10-PCS | Mod: 26,TXP,, | Performed by: INTERNAL MEDICINE

## 2022-12-21 PROCEDURE — 3008F BODY MASS INDEX DOCD: CPT | Mod: CPTII,S$GLB,TXP, | Performed by: STUDENT IN AN ORGANIZED HEALTH CARE EDUCATION/TRAINING PROGRAM

## 2022-12-21 PROCEDURE — 3074F SYST BP LT 130 MM HG: CPT | Mod: CPTII,S$GLB,TXP, | Performed by: STUDENT IN AN ORGANIZED HEALTH CARE EDUCATION/TRAINING PROGRAM

## 2022-12-21 PROCEDURE — 3074F PR MOST RECENT SYSTOLIC BLOOD PRESSURE < 130 MM HG: ICD-10-PCS | Mod: CPTII,S$GLB,TXP, | Performed by: STUDENT IN AN ORGANIZED HEALTH CARE EDUCATION/TRAINING PROGRAM

## 2022-12-21 PROCEDURE — 99999 PR PBB SHADOW E&M-EST. PATIENT-LVL IV: ICD-10-PCS | Mod: PBBFAC,TXP,, | Performed by: STUDENT IN AN ORGANIZED HEALTH CARE EDUCATION/TRAINING PROGRAM

## 2022-12-21 PROCEDURE — 76700 US ABDOMEN COMP WITH DOPPLER (XPD): ICD-10-PCS | Mod: 26,XS,TXP, | Performed by: RADIOLOGY

## 2022-12-21 PROCEDURE — 99203 PR OFFICE/OUTPT VISIT, NEW, LEVL III, 30-44 MIN: ICD-10-PCS | Mod: S$GLB,TXP,, | Performed by: STUDENT IN AN ORGANIZED HEALTH CARE EDUCATION/TRAINING PROGRAM

## 2022-12-21 PROCEDURE — 93320 DOPPLER ECHO COMPLETE: CPT | Mod: 26,TXP,, | Performed by: INTERNAL MEDICINE

## 2022-12-21 PROCEDURE — 93351 STRESS ECHO (CUPID ONLY): ICD-10-PCS | Mod: 26,TXP,, | Performed by: INTERNAL MEDICINE

## 2022-12-21 PROCEDURE — 3008F PR BODY MASS INDEX (BMI) DOCUMENTED: ICD-10-PCS | Mod: CPTII,S$GLB,TXP, | Performed by: STUDENT IN AN ORGANIZED HEALTH CARE EDUCATION/TRAINING PROGRAM

## 2022-12-21 PROCEDURE — 93975 US ABDOMEN COMP WITH DOPPLER (XPD): ICD-10-PCS | Mod: 26,TXP,, | Performed by: RADIOLOGY

## 2022-12-21 PROCEDURE — 93351 STRESS TTE COMPLETE: CPT | Mod: 26,TXP,, | Performed by: INTERNAL MEDICINE

## 2022-12-21 PROCEDURE — 99203 OFFICE O/P NEW LOW 30 MIN: CPT | Mod: S$GLB,TXP,, | Performed by: STUDENT IN AN ORGANIZED HEALTH CARE EDUCATION/TRAINING PROGRAM

## 2022-12-21 PROCEDURE — 3078F PR MOST RECENT DIASTOLIC BLOOD PRESSURE < 80 MM HG: ICD-10-PCS | Mod: CPTII,S$GLB,TXP, | Performed by: STUDENT IN AN ORGANIZED HEALTH CARE EDUCATION/TRAINING PROGRAM

## 2022-12-21 PROCEDURE — 3078F DIAST BP <80 MM HG: CPT | Mod: CPTII,S$GLB,TXP, | Performed by: STUDENT IN AN ORGANIZED HEALTH CARE EDUCATION/TRAINING PROGRAM

## 2022-12-21 PROCEDURE — 63600150 PHARM REV CODE 636: Mod: TXP | Performed by: INTERNAL MEDICINE

## 2022-12-21 PROCEDURE — 93325 STRESS ECHO (CUPID ONLY): ICD-10-PCS | Mod: 26,TXP,, | Performed by: INTERNAL MEDICINE

## 2022-12-21 PROCEDURE — 71046 XR CHEST PA AND LATERAL: ICD-10-PCS | Mod: 26,TXP,, | Performed by: RADIOLOGY

## 2022-12-21 PROCEDURE — 93975 VASCULAR STUDY: CPT | Mod: TC,59,TXP

## 2022-12-21 PROCEDURE — 93975 VASCULAR STUDY: CPT | Mod: 26,TXP,, | Performed by: RADIOLOGY

## 2022-12-21 PROCEDURE — 93351 STRESS TTE COMPLETE: CPT | Mod: TXP

## 2022-12-21 PROCEDURE — 71046 X-RAY EXAM CHEST 2 VIEWS: CPT | Mod: 26,TXP,, | Performed by: RADIOLOGY

## 2022-12-21 PROCEDURE — 71046 X-RAY EXAM CHEST 2 VIEWS: CPT | Mod: TC,FY,TXP

## 2022-12-21 PROCEDURE — 76700 US EXAM ABDOM COMPLETE: CPT | Mod: 26,XS,TXP, | Performed by: RADIOLOGY

## 2022-12-21 PROCEDURE — 99999 PR PBB SHADOW E&M-EST. PATIENT-LVL IV: CPT | Mod: PBBFAC,TXP,, | Performed by: STUDENT IN AN ORGANIZED HEALTH CARE EDUCATION/TRAINING PROGRAM

## 2022-12-21 PROCEDURE — 93325 DOPPLER ECHO COLOR FLOW MAPG: CPT | Mod: 26,TXP,, | Performed by: INTERNAL MEDICINE

## 2022-12-21 RX ORDER — DOBUTAMINE HYDROCHLORIDE 100 MG/100ML
20 INJECTION INTRAVENOUS
Status: COMPLETED | OUTPATIENT
Start: 2022-12-21 | End: 2022-12-21

## 2022-12-21 RX ADMIN — DOBUTAMINE IN DEXTROSE 20 MCG/KG/MIN: 100 INJECTION, SOLUTION INTRAVENOUS at 11:12

## 2022-12-21 NOTE — NURSING
Patient verified by 2 identifiers and allergies reviewed.  22g IV placed to RA.  DSE explained to patient, consent obtained & testing completed.  Pt tolerated testing well. IV removed post testing.  Post study discharge instructions reviewed with patient, patient verbalized understanding.  Patient discharged to home in stable condition.

## 2022-12-22 ENCOUNTER — SOCIAL WORK (OUTPATIENT)
Dept: TRANSPLANT | Facility: CLINIC | Age: 42
End: 2022-12-22
Payer: COMMERCIAL

## 2022-12-22 ENCOUNTER — OFFICE VISIT (OUTPATIENT)
Dept: TRANSPLANT | Facility: CLINIC | Age: 42
End: 2022-12-22
Attending: INTERNAL MEDICINE
Payer: COMMERCIAL

## 2022-12-22 ENCOUNTER — CLINICAL SUPPORT (OUTPATIENT)
Dept: TRANSPLANT | Facility: CLINIC | Age: 42
End: 2022-12-22
Payer: COMMERCIAL

## 2022-12-22 ENCOUNTER — PATIENT MESSAGE (OUTPATIENT)
Dept: TRANSPLANT | Facility: CLINIC | Age: 42
End: 2022-12-22

## 2022-12-22 VITALS
RESPIRATION RATE: 16 BRPM | SYSTOLIC BLOOD PRESSURE: 117 MMHG | DIASTOLIC BLOOD PRESSURE: 61 MMHG | WEIGHT: 173.31 LBS | BODY MASS INDEX: 28.87 KG/M2 | BODY MASS INDEX: 28.87 KG/M2 | SYSTOLIC BLOOD PRESSURE: 117 MMHG | TEMPERATURE: 97 F | TEMPERATURE: 97 F | RESPIRATION RATE: 16 BRPM | HEIGHT: 65 IN | WEIGHT: 173.31 LBS | HEIGHT: 65 IN | OXYGEN SATURATION: 99 % | HEART RATE: 93 BPM | HEIGHT: 65 IN | OXYGEN SATURATION: 99 % | WEIGHT: 173.31 LBS | DIASTOLIC BLOOD PRESSURE: 61 MMHG | SYSTOLIC BLOOD PRESSURE: 117 MMHG | TEMPERATURE: 97 F | OXYGEN SATURATION: 99 % | RESPIRATION RATE: 16 BRPM | BODY MASS INDEX: 28.87 KG/M2 | HEART RATE: 93 BPM | HEART RATE: 93 BPM | DIASTOLIC BLOOD PRESSURE: 61 MMHG

## 2022-12-22 DIAGNOSIS — K74.60 LIVER CIRRHOSIS SECONDARY TO NASH: Primary | Chronic | ICD-10-CM

## 2022-12-22 DIAGNOSIS — K75.81 LIVER CIRRHOSIS SECONDARY TO NASH: Primary | Chronic | ICD-10-CM

## 2022-12-22 DIAGNOSIS — Z95.828 S/P TIPS (TRANSJUGULAR INTRAHEPATIC PORTOSYSTEMIC SHUNT): Chronic | ICD-10-CM

## 2022-12-22 PROCEDURE — 3074F PR MOST RECENT SYSTOLIC BLOOD PRESSURE < 130 MM HG: ICD-10-PCS | Mod: CPTII,S$GLB,TXP, | Performed by: INTERNAL MEDICINE

## 2022-12-22 PROCEDURE — 99999 PR PBB SHADOW E&M-EST. PATIENT-LVL III: CPT | Mod: PBBFAC,TXP,,

## 2022-12-22 PROCEDURE — 3044F PR MOST RECENT HEMOGLOBIN A1C LEVEL <7.0%: ICD-10-PCS | Mod: CPTII,S$GLB,TXP, | Performed by: INTERNAL MEDICINE

## 2022-12-22 PROCEDURE — 3008F PR BODY MASS INDEX (BMI) DOCUMENTED: ICD-10-PCS | Mod: CPTII,S$GLB,TXP, | Performed by: INTERNAL MEDICINE

## 2022-12-22 PROCEDURE — 99999 PR PBB SHADOW E&M-EST. PATIENT-LVL III: ICD-10-PCS | Mod: PBBFAC,TXP,,

## 2022-12-22 PROCEDURE — 99213 OFFICE O/P EST LOW 20 MIN: CPT | Mod: S$GLB,TXP,, | Performed by: INTERNAL MEDICINE

## 2022-12-22 PROCEDURE — 99999 PR PBB SHADOW E&M-EST. PATIENT-LVL III: CPT | Mod: PBBFAC,TXP,, | Performed by: INTERNAL MEDICINE

## 2022-12-22 PROCEDURE — 3008F BODY MASS INDEX DOCD: CPT | Mod: CPTII,S$GLB,TXP, | Performed by: INTERNAL MEDICINE

## 2022-12-22 PROCEDURE — 4010F ACE/ARB THERAPY RXD/TAKEN: CPT | Mod: CPTII,S$GLB,TXP, | Performed by: INTERNAL MEDICINE

## 2022-12-22 PROCEDURE — 99214 OFFICE O/P EST MOD 30 MIN: CPT | Mod: S$GLB,TXP,, | Performed by: TRANSPLANT SURGERY

## 2022-12-22 PROCEDURE — 4010F PR ACE/ARB THEARPY RXD/TAKEN: ICD-10-PCS | Mod: CPTII,S$GLB,TXP, | Performed by: INTERNAL MEDICINE

## 2022-12-22 PROCEDURE — 3078F PR MOST RECENT DIASTOLIC BLOOD PRESSURE < 80 MM HG: ICD-10-PCS | Mod: CPTII,S$GLB,TXP, | Performed by: INTERNAL MEDICINE

## 2022-12-22 PROCEDURE — 99999 PR PBB SHADOW E&M-EST. PATIENT-LVL III: ICD-10-PCS | Mod: PBBFAC,TXP,, | Performed by: INTERNAL MEDICINE

## 2022-12-22 PROCEDURE — 99213 PR OFFICE/OUTPT VISIT, EST, LEVL III, 20-29 MIN: ICD-10-PCS | Mod: S$GLB,TXP,, | Performed by: INTERNAL MEDICINE

## 2022-12-22 PROCEDURE — 99999 PR PBB SHADOW E&M-EST. PATIENT-LVL I: CPT | Mod: PBBFAC,TXP,,

## 2022-12-22 PROCEDURE — 99999 PR PBB SHADOW E&M-EST. PATIENT-LVL I: ICD-10-PCS | Mod: PBBFAC,TXP,,

## 2022-12-22 PROCEDURE — 3078F DIAST BP <80 MM HG: CPT | Mod: CPTII,S$GLB,TXP, | Performed by: INTERNAL MEDICINE

## 2022-12-22 PROCEDURE — 3044F HG A1C LEVEL LT 7.0%: CPT | Mod: CPTII,S$GLB,TXP, | Performed by: INTERNAL MEDICINE

## 2022-12-22 PROCEDURE — 99214 PR OFFICE/OUTPT VISIT, EST, LEVL IV, 30-39 MIN: ICD-10-PCS | Mod: S$GLB,TXP,, | Performed by: TRANSPLANT SURGERY

## 2022-12-22 PROCEDURE — 3074F SYST BP LT 130 MM HG: CPT | Mod: CPTII,S$GLB,TXP, | Performed by: INTERNAL MEDICINE

## 2022-12-22 RX ORDER — HYDROCORTISONE ACETATE 25 MG/1
25 SUPPOSITORY RECTAL 2 TIMES DAILY PRN
COMMUNITY
End: 2023-02-28

## 2022-12-22 NOTE — PROGRESS NOTES
PHARM.D. PRE-TRANSPLANT NOTE:    This patient's medication therapy was evaluated as part of his pre-transplant evaluation.      The following general pharmacologic concerns were noted: none    The following concerns for post-operative pain management were noted: none    The following pharmacologic concerns related to HCV therapy were noted: none      This patient's medication profile was reviewed for considerations for DAA Hepatitis C therapy:    [x]  No current inducers of CYP 3A4 or PGP  [x]  No amiodarone on this patient's EMR profile in the last 24 months  [x]  No past or current atrial fibrillation on this patient's EMR profile       Current Outpatient Medications   Medication Sig Dispense Refill    EScitalopram oxalate (LEXAPRO) 20 MG tablet Take 20 mg by mouth once daily.      hydrocortisone (ANUSOL-HC) 25 mg suppository Place 25 mg rectally 2 (two) times daily as needed.      lactulose (CHRONULAC) 10 gram/15 mL solution Take 30 mLs by mouth 2 (two) times daily.      losartan (COZAAR) 50 MG tablet Take 50 mg by mouth every morning.      metFORMIN (GLUCOPHAGE) 1000 MG tablet Take 1,000 mg by mouth 2 (two) times daily.      metoprolol tartrate (LOPRESSOR) 25 MG tablet Take 12.5 mg by mouth 2 (two) times daily.      pantoprazole (PROTONIX) 40 MG tablet Take 40 mg by mouth 2 (two) times daily.      rosuvastatin (CRESTOR) 5 MG tablet Take 5 mg by mouth every morning.      XIFAXAN 550 mg Tab Take 550 mg by mouth 2 (two) times daily.       No current facility-administered medications for this visit.           I am available for consultation and can be contacted, as needed by the other members of the Transplant team.

## 2022-12-22 NOTE — LETTER
December 22, 2022        Alexi Leonardo  [address] [place], [place] [address] [place] [address]  Phone: [phone]  Fax: [phone]             [facility] Fl  [address]  [facility] [address]  Phone: [phone]   Patient: Michael Urbano   MR Number: [id number]   YOB: 1980   Date of Visit: 12/22/2022       Dear Dr. Alexi Leonardo    Thank you for referring Michael Urbano to me for evaluation. Attached you will find relevant portions of my assessment and plan of care.    If you have questions, please do not hesitate to call me. I look forward to following Michael Urbano along with you.    Sincerely,    Travis Rivas MD    Enclosure    If you would like to receive this communication electronically, please contact [email or web address] or [phone] to request [employer] access.    [employer] is a tool which provides read-only access to select patient information with whom you have a relationship. Its easy to use and provides real time access to review your patients record including encounter summaries, notes, results, and demographic information.    If you feel you have received this communication in error or would no longer like to receive these types of communications, please e-mail [email or web address]

## 2022-12-22 NOTE — PROGRESS NOTES
Michael seen in clinic for Fast Pass evaluation.  Handbook on pre-liver transplant information (see outline below) was given to the patient.  Patient's wife, accompanied him. Per clinic staff, patient viewed pre-liver transplant education slides via desktop in transplant clinic.  Informed consent signed and written information given on selection criteria.    LIVER TRANSPLANT WORK-UP EDUCATION   I. UNDERSTANDING THE TRANSPLANT PROCESS     A. Transplant team      B. MELD score      C. Balancing urgency and outcome     D. Liver Transplant Options         1.  Donor         2. Living Donor--rationale, benefits     E. Transplant Work-up         1. Medical         2. Psychological and Social--lifetime commitment, life changes, personal plan/ goal         3. Financial--fundraising     F.  Completed work-up and Next Steps    G. Wait Time         1.  Can be listed at more than one center         2.  Can transfer wait time     H. The Call       I. Possible donor options         1. DCD         2. Hep B Core and Hep C Positive         3. Increased Risk     J.  Liver Transplant Surgery         1. Length         2. Transfusions, cell saver         3. Surgical risks         4. What to expect after sugery--Central lines, drains, Farmer catheter, incision, endotracheal              tube, NG tube, length of stay in ICU/ TSU  II.  HOW TO BEST CARE FOR YOURSELF (Take Five To Thrive)  III. UNDERSTANDING LIFE AFTER TRANSPLANT  A. Medicines after transplant      1. Immunosuppression--infection and rejection  B. Labs   IV. ADULT LIVER SURVIVAL RATES      V.        RECURRENCE OF VIRAL HEPATITIS    VI.       Patient notified that HIV Testing is required for transplant evaluation and              repeated at scheduled intervals before and after transplant. Explained that              education will be provided, results will be discussed, and the appropriate              consults will be scheduled if needed.

## 2022-12-22 NOTE — PROGRESS NOTES
Transplant Recipient Adult Psychosocial Assessment  SW met with pt and wife in transplant clinic    Michael Davilaberry  [address] [place] [address]  [place] [address]  Telephone Information:   Mobile [phone]   Home  [phone] (home)  Work  [phone] (work)  E-mail  [email or web address]    Sex: male  YOB: 1980  Age: 42 y.o.    Encounter Date: 12/22/2022  [place] Citizen: yes  Primary Language: English   Needed: no    Emergency Contact:  Name: Antony Urbano  Relationship: wife  Address: same as patient  Phone Numbers:  C: [phone]    Family/Social Support:   Number of dependents/: one minor child  Marital history: Pt and wife have been  for 23yrs  Other family dynamics: Pt and wife have 3 children, ages 25, 21 and 12, all live in the home. Pt also has good support from his father, his mother in law, stepfather in law, brother and brother in law. All of these family members live near to patient. Pt's mother passed away a few years ago in a car accident.    Pt's father received a liver transplant at [facility] in 2021, pt was his primary caregiver.    Household Composition:  Name: Antony Urbano  Age: 42  Relationship: wife  Does person drive? yes  C: [phone]    Name: Amando Urbano  Age: 25  Relationship: son  Does person drive? yes    Name: Roula Urbano  Age: 21  Relationship: daughter  Does person drive? yes    Carlos Urbano, daughter, 12, does not drive.    Do you and your caregivers have access to reliable transportation? yes  PRIMARY CAREGIVER: Antony Sanchez will be primary caregiver, phone number [phone].      provided in-depth information to patient and caregiver regarding pre- and post-transplant caregiver role.   strongly encourages patient and caregiver to have concrete plan regarding post-transplant care giving, including back-up caregiver(s) to ensure care giving needs are met as needed.    Patient and Caregiver  states understanding all aspects of caregiver role/commitment and is able/willing/committed to being caregiver to the fullest extent necessary.    Patient and Caregiver verbalizes understanding of the education provided today and caregiver responsibilities.         remains available. Patient and Caregiver agree to contact  in a timely manner if concerns arise.      Able to take time off work without financial concerns: yes. Pt's wife has a [employer] business that she does from home.    Additional Significant Others who will Assist with Transplant:  Name: Cynthia Alvarado (confirmed)  City: [place] State: [place]  Relationship:  mother in law  Does person drive? yes  C: [phone]  Cynthia is a retired RN    Pt states either of his adult children, his brother, brother in law and father could also assist if needed. Pt reports having a very close and supportive family network.    Living Will: no  Healthcare Power of : no  Advance Directives on file: <<no information> per medical record.  Verbally reviewed LW/HCPA information.   provided patient with copy of LW/HCPA documents and provided education on completion of forms.    Living Donors: No.    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    Spouse/Significant Other Employment: [employer] business from home    Disabled: no    Monthly Income:  Salary/Wages: $6250, pt's salary  Able to afford all costs now and if transplanted, including medications: yes  Patient and Caregiver verbalizes understanding of personal responsibilities related to transplant costs and the importance of having a financial plan to ensure that patients  transplant costs are fully covered.      provided fundraising information/education.  Patient and Caregiver verbalizes understanding.   remains available.    Insurance:   Payer/Plan Subscr  Sex Relation Sub. Ins. ID Effective Group Num   1. JOSE DE JESUS CROSS OF* TANG ALLRED 1980 Male Self [id number]                                   [address] [address], [place] [address]     Primary Insurance (for UNOS reporting): Private Insurance  Secondary Insurance (for UNOS reporting): None  Patient and Caregiver verbalizes clear understanding that patient may experience difficulty obtaining and/or be denied insurance coverage post-surgery. This includes and is not limited to disability insurance, life insurance, health insurance, burial insurance, long term care insurance, and other insurances.    Patient and Caregiver also reports understanding that future health concerns related to or unrelated to transplantation may not be covered by patient's insurance.  Resources and information provided and reviewed.      Patient and Caregiver provides verbal permission to release any necessary information to outside resources for patient care and discharge planning.  Resources and information provided are reviewed.      Infusion Service: patient utilizing? no  Home Health: patient utilizing? no  DME:  CPAP  Pulmonary/Cardiac Rehab: None reported   ADLS:  Pt is independent and drives    Adherence:   Pt reports a high level of adherence to his plan of care.  Adherence education and counseling provided.     Per History Section:  Past Medical History:   Diagnosis Date    Anxiety and depression     Benign hypertension     Cirrhosis of liver without ascites     DAENDRE on CPAP     Type 2 diabetes mellitus without complication, without long-term current use of insulin      Social History     Tobacco Use    Smoking status: Never    Smokeless tobacco: Never   Substance Use Topics    Alcohol use: Not  on file     Social History     Substance and Sexual Activity   Drug Use Not on file     Social History     Substance and Sexual Activity   Sexual Activity Not on file       Per Today's Psychosocial:  Tobacco: none, patient denies any use.  Alcohol: none, patient denies any use.  Illicit Drugs/Non-prescribed Medications: none, patient denies any use.    Patient and Caregiver states clear understanding of the potential impact of substance use as it relates to transplant candidacy and is aware of possible random substance screening.  Substance abstinence/cessation counseling, education and resources provided and reviewed.     Arrests/DWI/Treatment/Rehab: patient denies    Psychiatric History:    Mental Health: anxiety  Psychiatrist/Counselor: None reported  Medications:  Lexapro prescribed by his PCP, pt reports symptoms are well managed  Suicide/Homicide Issues: None reported currently or in the past   Safety at home: Pt feels safe at home    Knowledge: Patient and Caregiver states having clear understanding and realistic expectations regarding the potential risks and potential benefits of organ transplantation and organ donation, agrees to discuss with health care team members and support system members, and to utilize available resources and express questions and/or concerns in order to further facilitate the pt informed decision-making.  Resources and information provided and reviewed.     Patient and Caregiver is aware of [facility]'s affiliation and/or partnership with agencies in home health care, LTAC, SNF, [facility], and other hospitals and clinics.    Understanding: Patient and Caregiver reports having a clear understanding of the many lifetime commitments involved with being a transplant recipient, including costs, compliance, medications, lab work, procedures, appointments, concrete and financial planning, preparedness, timely and appropriate communication of concerns, abstinence (ETOH, tobacco, illicit  non-prescribed drugs), adherence to all health care team recommendations, support system and caregiver involvement, appropriate and timely resource utilization and follow-through, mental health counseling as needed/recommended, and patient and caregiver responsibilities.  Social Service Handbook, resources and detailed educational information provided and reviewed.  Educational information provided.    Patient and Caregiver also reports current and expected compliance with health care regime and states having a clear understanding of the importance of compliance.      Patient and Caregiver reports a clear understanding that risks and benefits may be involved with organ transplantation and with organ donation.      Patient and Caregiver also reports clear understanding that psychosocial risk factors may affect patient, and include but are not limited to feelings of depression, generalized anxiety, anxiety regarding dependence on others, post traumatic stress disorder, feelings of guilt and other emotional and/or mental concerns, and/or exacerbation of existing mental health concerns.  Detailed resources provided and discussed.     Patient and Caregiver agrees to access appropriate resources in a timely manner as needed and/or as recommended, and to communicate concerns appropriately.  Patient and Caregiver also reports a clear understanding of treatment options available.      reviewed education, provided additional information, and answered questions.    Feelings or Concerns: Pt reports concerns about dying, support provided    Coping: Identify Patient & Caregiver Strategies to [place]:   1. Currently & Pre-transplant - Family support, work, Sikhism   2. At the time of surgery - Family support   3. During post-Transplant & Recovery Period - Family support    Goals: Continue with current activities, be around for his kids and travel.      Interview Behavior: Patient and Caregiver presents as alert and  oriented x 4, pleasant, communicative, and asking and answering questions appropriately.          Transplant Social Work - Candidacy  Assessment/Plan:     Psychosocial Suitability:  Based on psychosocial risk factors, patient presents as low risk, due to solid caregiver plan, no substance abuse history, anxiety symptoms are well managed, and pt has adequate insurance/finances . Pt was his [place]'s caregiver [place] [facility] [place], so is knowledgeable about the process.    Recommendations/Additional Comments:  -Fundraising if needed to pay for local stay post transplant      Valentina Ackerman LCSW

## 2022-12-22 NOTE — PROGRESS NOTES
Transplant Surgery Liver Transplant Recipient Evaluation    Referring Physician: Alexi Leonardo  Corresponding Physician: Alexi Leonardo    Subjective:     Reason for Visit: evaluate liver transplant candidacy    History of Present Illness: Michael Urbano is a 42 y.o. year old male who is being evaluated for liver transplantation.    Transplant History: N/A    Native Liver Disease (UNOS terminology): Cirrhosis: Other, Specify - unspecified (based on medical records from referral).    Manifestations of liver disease: edema, portal hypertension, thrombocytopenia, and variceal bleeding  MELD-Na score: 12 at 12/21/2022  7:45 AM  MELD score: 12 at 12/21/2022  7:45 AM  Calculated from:  Serum Creatinine: 0.8 mg/dL (Using min of 1 mg/dL) at 12/21/2022  7:45 AM  Serum Sodium: 138 mmol/L (Using max of 137 mmol/L) at 12/21/2022  7:45 AM  Total Bilirubin: 2.2 mg/dL at 12/21/2022  7:45 AM  INR(ratio): 1.3 at 12/21/2022  7:45 AM  Age: 42 years    External provider notes reviewed: Yes    PMH: reviewed  PSH: reviewed    Review of Systems   Constitutional:  Negative for activity change and appetite change.   HENT:  Negative for congestion and tinnitus.    Eyes:  Negative for redness and visual disturbance.   Respiratory:  Negative for cough and shortness of breath.    Cardiovascular:  Negative for chest pain and palpitations.   Gastrointestinal:  Negative for abdominal distention and abdominal pain.   Endocrine: Negative for polydipsia and polyuria.   Genitourinary:  Negative for decreased urine volume and dysuria.   Musculoskeletal:  Negative for arthralgias and back pain.   Skin:  Negative for pallor and rash.   Allergic/Immunologic: Negative for environmental allergies and immunocompromised state.   Neurological:  Negative for tremors and headaches.   Hematological:  Negative for adenopathy. Does not bruise/bleed easily.   Psychiatric/Behavioral:  Negative for behavioral problems and confusion.    Objective:      Physical Exam:  Constitutional:   Vitals reviewed: yes   Well-nourished and well-groomed: yes  Eyes:   Sclerae icteric: no   Extraocular movements intact: yes  GI:    Bowel sounds normal: yes   Tenderness: no    If yes, quadrant/location: not applicable   Palpable masses: no    If yes, quadrant/location: not applicable   Hepatosplenomegaly: no   Ascites: no   Hernia: no    If yes, type/location: not applicable   Surgical scars: yes    If yes, type/location: laparoscopic port sites  Resp:   Effort normal: yes   Breath sounds normal: yes    CV:   Regular rate and rhythm: yes   Heart sounds normal: yes   Femoral pulses normal: yes   Extremities edematous: no  Skin:   Rashes or lesions present: no    If yes, describe:not applicable   Jaundice:: no    Musculoskeletal:   Gait normal: yes   Strength normal: yes  Psych:   Oriented to person, place, and time: yes   Affect and mood normal: yes    Additional comments: not applicable    Diagnostics:  The following labs have been reviewed: CBC  CMP  The following radiology images have been independently reviewed and interpreted:          Transplant Surgery - Candidacy   Assessment/Plan:   I see no surgical contraindication to liver transplantation. Based on available information, Michael Urbano is a suitable liver transplant candidate. Final determination of transplant candidacy will be made once evaluation is complete and reviewed by the Liver Transplant Selection Committee. I encouraged living donation based on his low MELD score.     Patient advised that it is recommended that all transplant candidates, and their close contacts and household members receive Covid vaccination.    Additional testing to be completed according to Liver : Written Order Guideline for Adult Liver Transplant Evaluation (LI-02)    Interpretation of tests and discussion of patient management involves all members of the multidisciplinary transplant team.    Steven Pcaheco MD       Counseling: We  provided Michael Urbano with a group education session today.  We discussed liver transplantation at length with him, including risks, potential complications, and alternatives in the management of his liver disease.  The discussion included complications related to anesthesia, bleeding, infection, primary nonfunction, and vascular thrombosis.  I discussed the typical postoperative course, length of hospitalization, the need for long-term immunosuppression, and the need for long-term routine follow-up.  I discussed living-donor and -donor transplantation and the relative advantages and disadvantages of each.  I also discussed average waiting times for both living donation and  donation.  I discussed national and center-specific survival rates.  I also mentioned the potential benefit of multicenter listing to candidates listed with centers within more than one organ procurement organization.  All questions were answered.    Coronavirus disease (COVID-19) caused by severe acute respiratory virus coronavirus 2 (SARS-C0V 2) is associated with increased mortality in solid organ transplant recipients (SOT) compared to non-transplant patients. Vaccine responses to vaccination are depressed against SARS-CoV2 compared to normal individuals but improve with third vaccination doses. Vaccination prior to SOT provides both the best opportunity for transplant candidates to develop protective immunity and to reduce the risk of serious COVID19 infections post transplantation. Organ transplant candidates at [facility] will be required to receive SARS-CoV-2 vaccination prior to being listed with a an active status, whenever possible. Exceptions will be made for disability related reasons or for sincerely held Religion beliefs.     PHS: I discussed the use of organs from donors with PHS risk criteria, including the testing protocols utilized, as well as data from the literature  regarding the likelihood of transmission of hepatitis or HIV.  The patient is willing to consider such grafts.  DCD: I discussed the use of organs recovered by donation after cardiac death (DCD), including slightly decreased graft survival and greater risk of arterial and biliary complications. The potential advantage to the recipient is possibly receiving a transplant sooner by accepting such an organ. The patient is willing to consider such grafts.  HBcAb: I discussed the use of organs from donors with HBcAb in conjunction with long term use of HBV antiviral drugs, such as lamivudine. The small but measurable risk of hepatitis B seroconversion was discussed as well as the potentially life long need to continue antiviral drugs. The patient is willing to consider such grafts.  HCV Non-viremic recipient: I discussed the use of HCV-positive organs in naive recipients, including the risk of viral transmission to the patients or others, potential insurance barriers for antiviral medication coverage, risk for fibrosing cholestatic hepatitis, death or graft loss. The potential advantage to the recipient is the possibility of receiving a transplant sooner with decreased mortality risk by accepting such an organ. The patient is willing to consider such grafts.  LDLT: I discussed the nature of living donor liver transplant, including donor risks and more frequent recipient complications. The patient is willing to consider such grafts.  Covid: I discussed that this donor has tested positive for the covid virus, but with very low levels of virus and no evidence of covid disease. Although the risk of transmission is unknown, we believe this donor is not infectious and use of the abdominal organs is safe.  To date, these organs have been used with no evidence of transmission. The patient is willing to consider such grafts.

## 2022-12-22 NOTE — PROGRESS NOTES
Transplant Hepatology  Liver Transplant Recipient Evaluation      Consultation started: 12/22/2022 at 9:57 AM     Original Referring Provider: Alexi Leonardo  Current Corresponding Physician: Alexi SMILEY Native Liver Diagnosis: Cirrhosis: Other, Specify - unspecified    Reason for Visit: evaluation for liver transplant     Subjective:     Michael Urbano is a 42 y.o. 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  He is here to initiate his liver transplant evaluation.  His major complication of portal hypertension has been a shock producing GI bleed requiring a urgent tips shunt insertion.  He did have a Doppler ultrasound yesterday which may suggest tips shunt dysfunction.  I have messaged the interventional radiologist said see whether we can schedule a revision.  He currently has no symptoms of hepatic encephalopathy or GI bleeding.  He has minimal symptoms of salt water retention.  Review of Systems   Constitutional:  Negative for activity change, appetite change, chills, fatigue and unexpected weight change.   HENT:  Negative for congestion, facial swelling and tinnitus.    Eyes:  Negative for visual disturbance.   Respiratory:  Negative for cough, shortness of breath and wheezing.    Cardiovascular:  Negative for chest pain and palpitations.   Gastrointestinal:  Negative for abdominal distention.   Genitourinary:  Negative for dysuria.   Musculoskeletal:  Negative for arthralgias, joint swelling and myalgias.   Neurological:  Negative for syncope and headaches.   Hematological:  Does not bruise/bleed easily.   Psychiatric/Behavioral:  Negative for confusion.    Past Surgical History:   Procedure Laterality Date    COLONOSCOPY  09/07/2022    ESOPHAGOGASTRODUODENOSCOPY  09/07/2022    FRACTURE SURGERY      LEFT ARM    IR TIPS      10/9/2022    LAPAROSCOPIC CHOLECYSTECTOMY  11/18/2022     Current Outpatient Medications   Medication Sig    EScitalopram oxalate (LEXAPRO)  20 MG tablet Take 20 mg by mouth once daily.    hydrocortisone (ANUSOL-HC) 25 mg suppository Place 25 mg rectally 2 (two) times daily as needed.    lactulose (CHRONULAC) 10 gram/15 mL solution Take 30 mLs by mouth 2 (two) times daily.    losartan (COZAAR) 50 MG tablet Take 50 mg by mouth every morning.    metFORMIN (GLUCOPHAGE) 1000 MG tablet Take 1,000 mg by mouth 2 (two) times daily.    metoprolol tartrate (LOPRESSOR) 25 MG tablet Take 12.5 mg by mouth 2 (two) times daily.    pantoprazole (PROTONIX) 40 MG tablet Take 40 mg by mouth 2 (two) times daily.    rosuvastatin (CRESTOR) 5 MG tablet Take 5 mg by mouth every morning.    XIFAXAN 550 mg Tab Take 550 mg by mouth 2 (two) times daily.     No current facility-administered medications for this visit.       Objective:   Physical Exam  Constitutional:       Appearance: He is well-developed.   Eyes:      General: No scleral icterus.  Cardiovascular:      Rate and Rhythm: Normal rate and regular rhythm.      Heart sounds: Normal heart sounds.   Pulmonary:      Effort: Pulmonary effort is normal. No respiratory distress.      Breath sounds: Normal breath sounds. No wheezing.   Abdominal:      General: Bowel sounds are normal. There is no distension.      Palpations: Abdomen is soft. There is no mass.      Tenderness: There is no abdominal tenderness. There is no rebound.   Musculoskeletal:         General: Normal range of motion.   Lymphadenopathy:      Cervical: No cervical adenopathy.   Skin:     General: Skin is warm and dry.   Neurological:      Mental Status: He is alert and oriented to person, place, and time.     MELD-Na score: 12 at 12/21/2022  7:45 AM  MELD score: 12 at 12/21/2022  7:45 AM  Calculated from:  Serum Creatinine: 0.8 mg/dL (Using min of 1 mg/dL) at 12/21/2022  7:45 AM  Serum Sodium: 138 mmol/L (Using max of 137 mmol/L) at 12/21/2022  7:45 AM  Total Bilirubin: 2.2 mg/dL at 12/21/2022  7:45 AM  INR(ratio): 1.3 at 12/21/2022  7:45 AM  Age: 42  years  Lab Results   Component Value Date     (H) 12/21/2022    BUN 12 12/21/2022    CREATININE 0.8 12/21/2022    CALCIUM 9.5 12/21/2022     12/21/2022    K 4.0 12/21/2022     12/21/2022    PROT 7.3 12/21/2022    CO2 25 12/21/2022    WBC 1.49 (LL) 12/21/2022    RBC 3.92 (L) 12/21/2022    HGB 9.2 (L) 12/21/2022    HCT 30.6 (L) 12/21/2022    PLT 49 (L) 12/21/2022     Lab Results   Component Value Date    ALBUMIN 4.2 12/21/2022    BILITOT 2.2 (H) 12/21/2022    AST 22 12/21/2022    ALT 19 12/21/2022    ALKPHOS 145 (H) 12/21/2022    LABPROT 13.0 (H) 12/21/2022    INR 1.3 (H) 12/21/2022       Diagnostics:     1. Liver cirrhosis secondary to AMEZCUA    2. S/P TIPS (transjugular intrahepatic portosystemic shunt)        Transplant Hepatology - Candidacy   Assessment/Plan:     Transplant Candidacy: Michael Urbano is a 42 y.o. male with ESLD secondary to nonalcoholic steatohepatitis here to initiate his liver transplant evaluation.  He will be presented to our multidisciplinary liver transplant selection committee upon completion of his evaluation.  Patient advised that it is recommended that all transplant candidates, and their close contacts and household members receive Covid vaccination.    Travis Rivas MD         [facility] Patient Status  Functional Status: 90% - Able to carry on normal activity: minor symptoms of disease  Physical Capacity: No Limitations    Patient on life support: No  Diabetes: Type II  Any previous malignancy: No  Neoadjuvant Therapy: no  Has patient ever had a dx of HCC: yes  Previous Abdominal Surgery: no  Spontaneous Bacterial Peritonitis: no  History of Portal Vein Thrombosis: no  Transjugular Intrahepatic Portosystemic Shunt: yes

## 2022-12-23 ENCOUNTER — DOCUMENTATION ONLY (OUTPATIENT)
Dept: TRANSPLANT | Facility: CLINIC | Age: 42
End: 2022-12-23
Payer: COMMERCIAL

## 2022-12-27 DIAGNOSIS — K75.81 LIVER CIRRHOSIS SECONDARY TO NASH: Primary | Chronic | ICD-10-CM

## 2022-12-27 DIAGNOSIS — K74.60 LIVER CIRRHOSIS SECONDARY TO NASH: Primary | Chronic | ICD-10-CM

## 2022-12-27 DIAGNOSIS — Z95.828 S/P TIPS (TRANSJUGULAR INTRAHEPATIC PORTOSYSTEMIC SHUNT): Chronic | ICD-10-CM

## 2022-12-28 ENCOUNTER — DOCUMENTATION ONLY (OUTPATIENT)
Dept: TRANSPLANT | Facility: CLINIC | Age: 42
End: 2022-12-28
Payer: COMMERCIAL

## 2022-12-28 DIAGNOSIS — Z95.828 S/P TIPS (TRANSJUGULAR INTRAHEPATIC PORTOSYSTEMIC SHUNT): Primary | Chronic | ICD-10-CM

## 2022-12-29 ENCOUNTER — PATIENT MESSAGE (OUTPATIENT)
Dept: TRANSPLANT | Facility: CLINIC | Age: 42
End: 2022-12-29
Payer: COMMERCIAL

## 2023-01-04 ENCOUNTER — COMMITTEE REVIEW (OUTPATIENT)
Dept: TRANSPLANT | Facility: CLINIC | Age: 43
End: 2023-01-04

## 2023-01-05 NOTE — COMMITTEE REVIEW
Michael Urbano's case presented to selection committee.  Patient has been deferred for liver transplant . He is S/P TIPS. MELD low (12). Will defer for transplant at this time and continue to monitor indications for transplant. Patient will follow-up with Dr. Rivas in clinic.   I was present and agree with the committee's conclusions.

## 2023-01-23 ENCOUNTER — TELEPHONE (OUTPATIENT)
Dept: INTERVENTIONAL RADIOLOGY/VASCULAR | Facility: HOSPITAL | Age: 43
End: 2023-01-23
Payer: COMMERCIAL

## 2023-01-25 ENCOUNTER — HOSPITAL ENCOUNTER (OUTPATIENT)
Dept: INTERVENTIONAL RADIOLOGY/VASCULAR | Facility: HOSPITAL | Age: 43
Discharge: HOME | End: 2023-01-25
Attending: FAMILY MEDICINE
Payer: COMMERCIAL

## 2023-01-25 VITALS
TEMPERATURE: 98 F | SYSTOLIC BLOOD PRESSURE: 110 MMHG | OXYGEN SATURATION: 97 % | WEIGHT: 178 LBS | BODY MASS INDEX: 29.66 KG/M2 | HEIGHT: 65 IN | HEART RATE: 84 BPM | DIASTOLIC BLOOD PRESSURE: 70 MMHG | RESPIRATION RATE: 18 BRPM

## 2023-01-25 DIAGNOSIS — K75.81 LIVER CIRRHOSIS SECONDARY TO NASH: ICD-10-CM

## 2023-01-25 DIAGNOSIS — K74.60 LIVER CIRRHOSIS SECONDARY TO NASH: ICD-10-CM

## 2023-01-25 DIAGNOSIS — Z95.828 S/P TIPS (TRANSJUGULAR INTRAHEPATIC PORTOSYSTEMIC SHUNT): Primary | ICD-10-CM

## 2023-01-25 LAB — POCT GLUCOSE: 101 MG/DL (ref 70–110)

## 2023-01-25 PROCEDURE — 99153 MOD SED SAME PHYS/QHP EA: CPT | Mod: TXP | Performed by: RADIOLOGY

## 2023-01-25 PROCEDURE — 37183 REVISION TIPS: CPT | Mod: NTX | Performed by: RADIOLOGY

## 2023-01-25 PROCEDURE — 63600175 PHARM REV CODE 636 W HCPCS: Mod: NTX | Performed by: RADIOLOGY

## 2023-01-25 PROCEDURE — C1769 GUIDE WIRE: HCPCS | Mod: NTX

## 2023-01-25 PROCEDURE — 99152 MOD SED SAME PHYS/QHP 5/>YRS: CPT | Mod: NTX | Performed by: RADIOLOGY

## 2023-01-25 PROCEDURE — 37183 IR TIPS REVISION: ICD-10-PCS | Mod: RT,NTX,, | Performed by: RADIOLOGY

## 2023-01-25 PROCEDURE — 25500020 PHARM REV CODE 255: Mod: NTX | Performed by: FAMILY MEDICINE

## 2023-01-25 PROCEDURE — 25000003 PHARM REV CODE 250: Mod: NTX | Performed by: FAMILY MEDICINE

## 2023-01-25 PROCEDURE — 99152 PR MOD CONSCIOUS SEDATION, SAME PHYS, 5+ YRS, FIRST 15 MIN: ICD-10-PCS | Mod: NTX,,, | Performed by: RADIOLOGY

## 2023-01-25 PROCEDURE — 99152 MOD SED SAME PHYS/QHP 5/>YRS: CPT | Mod: NTX,,, | Performed by: RADIOLOGY

## 2023-01-25 RX ORDER — ONDANSETRON 2 MG/ML
4 INJECTION INTRAMUSCULAR; INTRAVENOUS EVERY 6 HOURS PRN
Status: DISCONTINUED | OUTPATIENT
Start: 2023-01-25 | End: 2023-01-26 | Stop reason: HOSPADM

## 2023-01-25 RX ORDER — FENTANYL CITRATE 50 UG/ML
INJECTION, SOLUTION INTRAMUSCULAR; INTRAVENOUS
Status: DISCONTINUED | OUTPATIENT
Start: 2023-01-25 | End: 2023-01-26 | Stop reason: HOSPADM

## 2023-01-25 RX ORDER — SODIUM CHLORIDE 9 MG/ML
INJECTION, SOLUTION INTRAVENOUS CONTINUOUS
Status: DISCONTINUED | OUTPATIENT
Start: 2023-01-25 | End: 2023-01-26 | Stop reason: HOSPADM

## 2023-01-25 RX ORDER — MIDAZOLAM HYDROCHLORIDE 1 MG/ML
INJECTION INTRAMUSCULAR; INTRAVENOUS
Status: DISCONTINUED | OUTPATIENT
Start: 2023-01-25 | End: 2023-01-26 | Stop reason: HOSPADM

## 2023-01-25 RX ORDER — LIDOCAINE HYDROCHLORIDE 10 MG/ML
1 INJECTION, SOLUTION EPIDURAL; INFILTRATION; INTRACAUDAL; PERINEURAL ONCE
Status: DISCONTINUED | OUTPATIENT
Start: 2023-01-25 | End: 2023-01-26 | Stop reason: HOSPADM

## 2023-01-25 RX ADMIN — FENTANYL CITRATE 25 MCG: 50 INJECTION, SOLUTION INTRAMUSCULAR; INTRAVENOUS at 02:01

## 2023-01-25 RX ADMIN — MIDAZOLAM HYDROCHLORIDE 0.5 MG: 1 INJECTION INTRAMUSCULAR; INTRAVENOUS at 01:01

## 2023-01-25 RX ADMIN — FENTANYL CITRATE 25 MCG: 50 INJECTION, SOLUTION INTRAMUSCULAR; INTRAVENOUS at 01:01

## 2023-01-25 RX ADMIN — MIDAZOLAM HYDROCHLORIDE 0.5 MG: 1 INJECTION INTRAMUSCULAR; INTRAVENOUS at 02:01

## 2023-01-25 RX ADMIN — SODIUM CHLORIDE: 0.9 INJECTION, SOLUTION INTRAVENOUS at 01:01

## 2023-01-25 RX ADMIN — IOHEXOL 45 ML: 300 INJECTION, SOLUTION INTRAVENOUS at 03:01

## 2023-01-25 NOTE — PROGRESS NOTES
Pt AAO w/ NAD and ready for D/C home / review S&S of infection and when to call  911 or go to ED / pt wife present and review D/C instructions

## 2023-01-25 NOTE — DISCHARGE SUMMARY
Radiology Discharge Summary      Hospital Course: No complications    Admit Date: 1/25/2023  Discharge Date: 01/25/2023     Instructions Given to Patient: Yes  Diet: Resume prior diet  Activity: activity as tolerated    Description of Condition on Discharge: Stable  Vital Signs (Most Recent): Temp: 98.6 °F (37 °C) (01/25/23 1057)  Pulse: 98 (01/25/23 1520)  Resp: 16 (01/25/23 1520)  BP: (!) 106/55 (01/25/23 1520)  SpO2: (!) 93 % (01/25/23 1520)    Discharge Disposition: Home    Discharge Diagnosis: TIPS malfunction    Antonio Norman DO

## 2023-01-25 NOTE — PROCEDURES
Interventional Radiology postop note    Pre Op Diagnosis: TIPS malfunction   Post Op Diagnosis: Same    Procedure: TIPS revision     Procedure performed by: Chiquis    Written Informed Consent Obtained: Yes  Specimen Removed: NO  Estimated Blood Loss: Minimal    Findings:   Initial portogram demonstrated minimal flow through the TIPS. A 7 mm Viatorr stent was placed overlapping the prior stent with extension into the right hepatic vein. 10 mm venoplasty was performed prior to stent placement and after. Post stent placement demonstrated improved flow.     Pressures are as follows:    Pre:  Main portal = 25  Right atrium = 9     Post venoplasty  Main portal = 18  Right atrium = 8     Post revision  Main portal = 20  Right atrium = 10    Patient tolerated procedure well.    Patient to follow up with TIPS doppler and IR clinic visit in one month    Antonio Norman DO  Interventional Radiology

## 2023-01-25 NOTE — PLAN OF CARE
Pressures    Pre:  Main portal = 25  Right atrium = 9    Post venoplasty  Main portal = 18  Right atrium = 8    Post revision  Main portal = 20  Right atrium = 10

## 2023-01-25 NOTE — DISCHARGE INSTRUCTIONS
For scheduling: Call Xiao at [phone]    For questions or concerns call: SCAR MON-F[facility] 8 AM- 5PM [phone]. Radiology resident on call [phone].    For immediate concerns that are not emergent, you may call our radiology clinic at: [phone]

## 2023-01-25 NOTE — H&P
Radiology History & Physical      SUBJECTIVE:     Chief Complaint:  Cirrhosis with esophogeal varices     History of Present Illness:  Michael Urbano is a 42 y.o. male who presents for TIPS revision. Hx of ESLD secondary to AMEZCUA (non-alcoholic steatohepatitis).  Underwent liver transplant evaluation, for which he has been deferred for liver transplant. Complicated by portal hypertension requiring an urgent tips shunt insertion for shock producing GI bleed at [facility].  Recent Doppler ultrasound concerning for  tips shunt dysfunction.    Past Medical History:   Diagnosis Date    Anxiety and depression     Benign hypertension     Cirrhosis of liver without ascites     DEANDRE on CPAP     Type 2 diabetes mellitus without complication, without long-term current use of insulin      Past Surgical History:   Procedure Laterality Date    COLONOSCOPY  09/07/2022    ESOPHAGOGASTRODUODENOSCOPY  09/07/2022    FRACTURE SURGERY      LEFT ARM    IR TIPS      10/9/2022    LAPAROSCOPIC CHOLECYSTECTOMY  11/18/2022       Home Meds:   Prior to Admission medications    Medication Sig Start Date End Date Taking? Authorizing Provider   EScitalopram oxalate (LEXAPRO) 20 MG tablet Take 20 mg by mouth once daily. 11/7/22   Historical Provider   hydrocortisone (ANUSOL-HC) 25 mg suppository Place 25 mg rectally 2 (two) times daily as needed.    Historical Provider   lactulose (CHRONULAC) 10 gram/15 mL solution Take 30 mLs by mouth 2 (two) times daily. 11/7/22   Historical Provider   losartan (COZAAR) 50 MG tablet Take 50 mg by mouth every morning. 11/7/22   Historical Provider   metFORMIN (GLUCOPHAGE) 1000 MG tablet Take 1,000 mg by mouth 2 (two) times daily. 11/7/22   Historical Provider   metoprolol tartrate (LOPRESSOR) 25 MG tablet Take 12.5 mg by mouth 2 (two) times daily. 11/3/22   Historical Provider   pantoprazole (PROTONIX) 40 MG tablet Take 40 mg by mouth 2 (two) times daily. 11/7/22   Historical Provider   rosuvastatin (CRESTOR)  5 MG tablet Take 5 mg by mouth every morning. 11/7/22   Historical Provider   XIFAXAN 550 mg Tab Take 550 mg by mouth 2 (two) times daily. 11/7/22   Historical Provider     Anticoagulants/Antiplatelets: no anticoagulation    Allergies:   Review of patient's allergies indicates:   Allergen Reactions    Penicillins Hives and Other (See Comments)     Childhood allergy, but also remembers taking amoxicillin as a child       Sedation History:  have not been any systemic reactions    Review of Systems:   Hematological: negative  no known coagulopathies  Respiratory: no cough, shortness of breath, or wheezing  no shortness of breath  Cardiovascular: no chest pain or dyspnea on exertion  no chest pain  Gastrointestinal: no abdominal pain, change in bowel habits, or black or bloody stools  no abdominal pain  Genito-Urinary: no dysuria, trouble voiding, or hematuria  no dysuria  Musculoskeletal: negative  Neurological: no TIA or stroke symptoms         OBJECTIVE:     Vital Signs (Most Recent)       Physical Exam:  ASA: 2  Mallampati: 2    General: no acute distress  Mental Status: alert and oriented to person, place and time  HEENT: normocephalic, atraumatic  Chest: unlabored breathing  Heart: regular heart rate  Abdomen: nondistended  Extremity: moves all extremities    Laboratory  Lab Results   Component Value Date    INR 1.3 (H) 01/25/2023       Lab Results   Component Value Date    WBC 1.55 (LL) 01/25/2023    HGB 8.4 (L) 01/25/2023    HCT 27.8 (L) 01/25/2023    MCV 75 (L) 01/25/2023    PLT 59 (L) 01/25/2023      Lab Results   Component Value Date     (H) 01/25/2023     01/25/2023    K 4.3 01/25/2023     01/25/2023    CO2 24 01/25/2023    BUN 10 01/25/2023    CREATININE 0.8 01/25/2023    CALCIUM 8.9 01/25/2023    ALT 19 01/25/2023    AST 26 01/25/2023    ALBUMIN 3.9 01/25/2023    BILITOT 1.8 (H) 01/25/2023    BILIDIR 0.9 (H) 12/21/2022       ASSESSMENT/PLAN:     Sedation Plan: Moderate   Patient will  undergo TIPS revision.    Antonio Norman,

## 2023-01-26 DIAGNOSIS — Z95.828 S/P TIPS (TRANSJUGULAR INTRAHEPATIC PORTOSYSTEMIC SHUNT): Primary | ICD-10-CM

## 2023-02-06 ENCOUNTER — PATIENT MESSAGE (OUTPATIENT)
Dept: TRANSPLANT | Facility: CLINIC | Age: 43
End: 2023-02-06
Payer: COMMERCIAL

## 2023-02-16 ENCOUNTER — PATIENT MESSAGE (OUTPATIENT)
Dept: TRANSPLANT | Facility: CLINIC | Age: 43
End: 2023-02-16
Payer: COMMERCIAL

## 2023-02-16 DIAGNOSIS — E11.9 TYPE 2 DIABETES MELLITUS WITHOUT COMPLICATION, WITHOUT LONG-TERM CURRENT USE OF INSULIN: Primary | ICD-10-CM

## 2023-02-17 ENCOUNTER — PATIENT MESSAGE (OUTPATIENT)
Dept: TRANSPLANT | Facility: CLINIC | Age: 43
End: 2023-02-17
Payer: COMMERCIAL

## 2023-02-17 ENCOUNTER — TELEPHONE (OUTPATIENT)
Dept: TRANSPLANT | Facility: CLINIC | Age: 43
End: 2023-02-17
Payer: COMMERCIAL

## 2023-02-17 NOTE — TELEPHONE ENCOUNTER
----- Message from Rosaura Laureano sent at 2/17/2023  3:03 PM CST -----    Called and sp to pt; endocrine appt has been dora'ed for 2/28 at 8AM.  .

## 2023-02-20 ENCOUNTER — TELEPHONE (OUTPATIENT)
Dept: TRANSPLANT | Facility: CLINIC | Age: 43
End: 2023-02-20
Payer: COMMERCIAL

## 2023-02-20 NOTE — TELEPHONE ENCOUNTER
----- Message from Rosaura Laureano sent at 2/20/2023 10:00 AM CST -----  Regarding: FW: appt    Returned call to pt and told him that Dr. Rivas can only do VV appts for pts that are in the state of [place]. Pt appt has been re/dora'ed to 3/27.  .  ----- Message -----  From: Kristi Alvarez  Sent: 2/20/2023   9:52 AM CST  To: [facility] Pre-Liver Transplant Non-Clinical  Subject: appt                                                 Name Of Caller:  Michael      Contact Preference?:   [phone]        Nature of Call?  Patient had a death in the family and has an 11:30am appointment with Dr. Rivas.  Patient would like for that appointment to be a virtual one.  Please call patient to let him know if this is possible or not.
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
## 2023-02-28 ENCOUNTER — HOSPITAL ENCOUNTER (OUTPATIENT)
Dept: RADIOLOGY | Facility: HOSPITAL | Age: 43
Discharge: HOME | End: 2023-02-28
Payer: COMMERCIAL

## 2023-02-28 ENCOUNTER — OFFICE VISIT (OUTPATIENT)
Dept: INTERVENTIONAL RADIOLOGY/VASCULAR | Facility: CLINIC | Age: 43
End: 2023-02-28
Payer: COMMERCIAL

## 2023-02-28 ENCOUNTER — OFFICE VISIT (OUTPATIENT)
Dept: ENDOCRINOLOGY | Facility: CLINIC | Age: 43
End: 2023-02-28
Attending: INTERNAL MEDICINE
Payer: COMMERCIAL

## 2023-02-28 VITALS
WEIGHT: 177.56 LBS | HEART RATE: 79 BPM | SYSTOLIC BLOOD PRESSURE: 108 MMHG | BODY MASS INDEX: 29.58 KG/M2 | DIASTOLIC BLOOD PRESSURE: 62 MMHG | HEIGHT: 65 IN | OXYGEN SATURATION: 98 %

## 2023-02-28 VITALS
DIASTOLIC BLOOD PRESSURE: 65 MMHG | HEART RATE: 96 BPM | SYSTOLIC BLOOD PRESSURE: 106 MMHG | WEIGHT: 178.69 LBS | BODY MASS INDEX: 29.73 KG/M2

## 2023-02-28 DIAGNOSIS — E11.9 TYPE 2 DIABETES MELLITUS WITHOUT COMPLICATION, WITHOUT LONG-TERM CURRENT USE OF INSULIN: ICD-10-CM

## 2023-02-28 DIAGNOSIS — Z01.818 PRE-TRANSPLANT EVALUATION FOR CHRONIC LIVER DISEASE: ICD-10-CM

## 2023-02-28 DIAGNOSIS — M81.8 OTHER OSTEOPOROSIS WITHOUT CURRENT PATHOLOGICAL FRACTURE: ICD-10-CM

## 2023-02-28 DIAGNOSIS — E05.90 SUBCLINICAL HYPERTHYROIDISM: ICD-10-CM

## 2023-02-28 DIAGNOSIS — M81.0 OSTEOPOROSIS, UNSPECIFIED OSTEOPOROSIS TYPE, UNSPECIFIED PATHOLOGICAL FRACTURE PRESENCE: Primary | ICD-10-CM

## 2023-02-28 DIAGNOSIS — R79.89 ABNORMAL THYROID STIMULATING HORMONE (TSH) LEVEL: ICD-10-CM

## 2023-02-28 DIAGNOSIS — Z95.828 S/P TIPS (TRANSJUGULAR INTRAHEPATIC PORTOSYSTEMIC SHUNT): Primary | Chronic | ICD-10-CM

## 2023-02-28 DIAGNOSIS — Z95.828 S/P TIPS (TRANSJUGULAR INTRAHEPATIC PORTOSYSTEMIC SHUNT): ICD-10-CM

## 2023-02-28 PROCEDURE — 4010F PR ACE/ARB THEARPY RXD/TAKEN: ICD-10-PCS | Mod: CPTII,S$GLB,TXP, | Performed by: STUDENT IN AN ORGANIZED HEALTH CARE EDUCATION/TRAINING PROGRAM

## 2023-02-28 PROCEDURE — 93976 VASCULAR STUDY: CPT | Mod: TC,NTX

## 2023-02-28 PROCEDURE — 3008F BODY MASS INDEX DOCD: CPT | Mod: CPTII,S$GLB,TXP, | Performed by: STUDENT IN AN ORGANIZED HEALTH CARE EDUCATION/TRAINING PROGRAM

## 2023-02-28 PROCEDURE — 4010F ACE/ARB THERAPY RXD/TAKEN: CPT | Mod: CPTII,S$GLB,TXP, | Performed by: STUDENT IN AN ORGANIZED HEALTH CARE EDUCATION/TRAINING PROGRAM

## 2023-02-28 PROCEDURE — 3008F BODY MASS INDEX DOCD: CPT | Mod: CPTII,NTX,S$GLB, | Performed by: PHYSICIAN ASSISTANT

## 2023-02-28 PROCEDURE — 3078F DIAST BP <80 MM HG: CPT | Mod: CPTII,S$GLB,TXP, | Performed by: STUDENT IN AN ORGANIZED HEALTH CARE EDUCATION/TRAINING PROGRAM

## 2023-02-28 PROCEDURE — 99204 PR OFFICE/OUTPT VISIT, NEW, LEVL IV, 45-59 MIN: ICD-10-PCS | Mod: S$GLB,TXP,, | Performed by: STUDENT IN AN ORGANIZED HEALTH CARE EDUCATION/TRAINING PROGRAM

## 2023-02-28 PROCEDURE — 3074F PR MOST RECENT SYSTOLIC BLOOD PRESSURE < 130 MM HG: ICD-10-PCS | Mod: CPTII,S$GLB,TXP, | Performed by: STUDENT IN AN ORGANIZED HEALTH CARE EDUCATION/TRAINING PROGRAM

## 2023-02-28 PROCEDURE — 3044F PR MOST RECENT HEMOGLOBIN A1C LEVEL <7.0%: ICD-10-PCS | Mod: CPTII,S$GLB,TXP, | Performed by: STUDENT IN AN ORGANIZED HEALTH CARE EDUCATION/TRAINING PROGRAM

## 2023-02-28 PROCEDURE — 3044F HG A1C LEVEL LT 7.0%: CPT | Mod: CPTII,NTX,S$GLB, | Performed by: PHYSICIAN ASSISTANT

## 2023-02-28 PROCEDURE — 4010F PR ACE/ARB THEARPY RXD/TAKEN: ICD-10-PCS | Mod: CPTII,NTX,S$GLB, | Performed by: PHYSICIAN ASSISTANT

## 2023-02-28 PROCEDURE — 3078F DIAST BP <80 MM HG: CPT | Mod: CPTII,NTX,S$GLB, | Performed by: PHYSICIAN ASSISTANT

## 2023-02-28 PROCEDURE — 1160F PR REVIEW ALL MEDS BY PRESCRIBER/CLIN PHARMACIST DOCUMENTED: ICD-10-PCS | Mod: CPTII,NTX,S$GLB, | Performed by: PHYSICIAN ASSISTANT

## 2023-02-28 PROCEDURE — 3074F PR MOST RECENT SYSTOLIC BLOOD PRESSURE < 130 MM HG: ICD-10-PCS | Mod: CPTII,NTX,S$GLB, | Performed by: PHYSICIAN ASSISTANT

## 2023-02-28 PROCEDURE — 3074F SYST BP LT 130 MM HG: CPT | Mod: CPTII,S$GLB,TXP, | Performed by: STUDENT IN AN ORGANIZED HEALTH CARE EDUCATION/TRAINING PROGRAM

## 2023-02-28 PROCEDURE — 99999 PR PBB SHADOW E&M-EST. PATIENT-LVL III: ICD-10-PCS | Mod: PBBFAC,TXP,, | Performed by: PHYSICIAN ASSISTANT

## 2023-02-28 PROCEDURE — 99999 PR PBB SHADOW E&M-EST. PATIENT-LVL V: CPT | Mod: PBBFAC,TXP,, | Performed by: STUDENT IN AN ORGANIZED HEALTH CARE EDUCATION/TRAINING PROGRAM

## 2023-02-28 PROCEDURE — 4010F ACE/ARB THERAPY RXD/TAKEN: CPT | Mod: CPTII,NTX,S$GLB, | Performed by: PHYSICIAN ASSISTANT

## 2023-02-28 PROCEDURE — 3078F PR MOST RECENT DIASTOLIC BLOOD PRESSURE < 80 MM HG: ICD-10-PCS | Mod: CPTII,NTX,S$GLB, | Performed by: PHYSICIAN ASSISTANT

## 2023-02-28 PROCEDURE — 1160F RVW MEDS BY RX/DR IN RCRD: CPT | Mod: CPTII,NTX,S$GLB, | Performed by: PHYSICIAN ASSISTANT

## 2023-02-28 PROCEDURE — 3044F PR MOST RECENT HEMOGLOBIN A1C LEVEL <7.0%: ICD-10-PCS | Mod: CPTII,NTX,S$GLB, | Performed by: PHYSICIAN ASSISTANT

## 2023-02-28 PROCEDURE — 99999 PR PBB SHADOW E&M-EST. PATIENT-LVL III: CPT | Mod: PBBFAC,TXP,, | Performed by: PHYSICIAN ASSISTANT

## 2023-02-28 PROCEDURE — 99999 PR PBB SHADOW E&M-EST. PATIENT-LVL V: ICD-10-PCS | Mod: PBBFAC,TXP,, | Performed by: STUDENT IN AN ORGANIZED HEALTH CARE EDUCATION/TRAINING PROGRAM

## 2023-02-28 PROCEDURE — 76705 ECHO EXAM OF ABDOMEN: CPT | Mod: 26,XS,TXP, | Performed by: RADIOLOGY

## 2023-02-28 PROCEDURE — 3078F PR MOST RECENT DIASTOLIC BLOOD PRESSURE < 80 MM HG: ICD-10-PCS | Mod: CPTII,S$GLB,TXP, | Performed by: STUDENT IN AN ORGANIZED HEALTH CARE EDUCATION/TRAINING PROGRAM

## 2023-02-28 PROCEDURE — 3074F SYST BP LT 130 MM HG: CPT | Mod: CPTII,NTX,S$GLB, | Performed by: PHYSICIAN ASSISTANT

## 2023-02-28 PROCEDURE — 1159F PR MEDICATION LIST DOCUMENTED IN MEDICAL RECORD: ICD-10-PCS | Mod: CPTII,NTX,S$GLB, | Performed by: PHYSICIAN ASSISTANT

## 2023-02-28 PROCEDURE — 76705 US LIVER WITH DOPPLER: ICD-10-PCS | Mod: 26,XS,TXP, | Performed by: RADIOLOGY

## 2023-02-28 PROCEDURE — 99213 OFFICE O/P EST LOW 20 MIN: CPT | Mod: NTX,S$GLB,, | Performed by: PHYSICIAN ASSISTANT

## 2023-02-28 PROCEDURE — 93976 VASCULAR STUDY: CPT | Mod: 26,TXP,, | Performed by: RADIOLOGY

## 2023-02-28 PROCEDURE — 1159F MED LIST DOCD IN RCRD: CPT | Mod: CPTII,NTX,S$GLB, | Performed by: PHYSICIAN ASSISTANT

## 2023-02-28 PROCEDURE — 99213 PR OFFICE/OUTPT VISIT, EST, LEVL III, 20-29 MIN: ICD-10-PCS | Mod: NTX,S$GLB,, | Performed by: PHYSICIAN ASSISTANT

## 2023-02-28 PROCEDURE — 3044F HG A1C LEVEL LT 7.0%: CPT | Mod: CPTII,S$GLB,TXP, | Performed by: STUDENT IN AN ORGANIZED HEALTH CARE EDUCATION/TRAINING PROGRAM

## 2023-02-28 PROCEDURE — 3008F PR BODY MASS INDEX (BMI) DOCUMENTED: ICD-10-PCS | Mod: CPTII,NTX,S$GLB, | Performed by: PHYSICIAN ASSISTANT

## 2023-02-28 PROCEDURE — 93976 US LIVER WITH DOPPLER: ICD-10-PCS | Mod: 26,TXP,, | Performed by: RADIOLOGY

## 2023-02-28 PROCEDURE — 3008F PR BODY MASS INDEX (BMI) DOCUMENTED: ICD-10-PCS | Mod: CPTII,S$GLB,TXP, | Performed by: STUDENT IN AN ORGANIZED HEALTH CARE EDUCATION/TRAINING PROGRAM

## 2023-02-28 PROCEDURE — 99204 OFFICE O/P NEW MOD 45 MIN: CPT | Mod: S$GLB,TXP,, | Performed by: STUDENT IN AN ORGANIZED HEALTH CARE EDUCATION/TRAINING PROGRAM

## 2023-02-28 RX ORDER — FLASH GLUCOSE SENSOR
1 KIT MISCELLANEOUS
Qty: 1 KIT | Refills: 11 | Status: SHIPPED | OUTPATIENT
Start: 2023-02-28

## 2023-02-28 RX ORDER — SPIRONOLACTONE 50 MG/1
50 TABLET, FILM COATED ORAL DAILY
COMMUNITY
Start: 2023-01-30

## 2023-02-28 RX ORDER — LEVOFLOXACIN 500 MG/1
500 TABLET, FILM COATED ORAL
COMMUNITY
Start: 2022-10-12 | End: 2023-03-27

## 2023-02-28 RX ORDER — LACTULOSE 10 G/15ML
20 SOLUTION ORAL; RECTAL 3 TIMES DAILY
COMMUNITY
Start: 2023-02-21

## 2023-02-28 NOTE — ASSESSMENT & PLAN NOTE
- Risks include Chronic PPI therapy and chronic liver disease. Had exposure to hydrocortisone suppository but only for 3 weeks  - Will request further work up of accelerated bone loss and order a testosterone panel, 24 urine calcium, PTH, SPEP, and UPEP  - Will replete Vitamin D with 4000 IU daily Cholecalciferol   - Reviewed CMP and TSH   - Advised intake of calcium (5355-5643 mg /day in divided doses) and suggested that Calcium from food sources is preferred (provided with food content table on discharge instructions today)  - Fall precautions emphasized  - Advised weight bearing exercise  - Treatment options and potential side effects of therapy discussed, will initiate treatment once secondary work up is completed

## 2023-02-28 NOTE — ASSESSMENT & PLAN NOTE
- Will check HbA1c, Lipid panel and Urine microalbumin. HbA1c might not be accurate considering underlying anemia  - Advised frequent blood glucose monitoring at home in the mean while. Will send paperwork for DEXCOM. Due to Covid-19 pandemic patient and provider need to monitor and manage diabetes with a dexcom CGM.   - Exercise and Dietary changes recommended  - Will continue Metformin 1000 mg twice daily as he is tolerating without any issues  - Continue Crestor 5 mg daily and losartan 50 mg daily  - Foot examination normal today  - Continue annual visits with Eye doctor in [place] and fax us paperwork from visitation.

## 2023-02-28 NOTE — ASSESSMENT & PLAN NOTE
- will repeat to check again  - could be normal variant vs mild subclinical hyperthyroidism vs use of steroids

## 2023-02-28 NOTE — PATIENT INSTRUCTIONS
Estimated Calcium Content of Foods:  Produce  Serving Size Estimated Calcium*    [place] greens, frozen 8 oz 360 mg   Broccoli samantha 8 oz 200 mg   Kale, frozen 8 oz 180 mg   Soy Beans, green, boiled 8 oz 175 mg   Bok [place], cooked, boiled 8 oz 160 mg   Figs, dried 2 figs 65 mg   Broccoli, fresh, cooked 8 oz 60 mg   Oranges 1 whole 55 mg   Seafood Serving Size Estimated Calcium*    Sardines, canned with bones 3 oz 325 mg   [place], canned with bones 3 oz 180 mg   Shrimp, canned 3 oz 125 mg   Dairy Serving Size Estimated Calcium*    Ricotta, part-skim 4 oz 335 mg   Yogurt, plain, low-fat 6 oz 310 mg   Milk, skim, low-fat, whole 8 oz 300 mg   Yogurt with fruit, low-fat 6 oz 260 mg   Mozzarella, part-skim 1 oz 210 mg   Cheddar 1 oz 205 mg   Yogurt, Greek 6 oz 200 mg   American Cheese 1 oz 195 mg   Feta Cheese 4 oz 140 mg   Cottage Cheese, 2% 4 oz 105 mg   Frozen yogurt, vanilla 8 oz 105 mg   Ice Cream, vanilla 8 oz 85 mg   Parmesan 1 tbsp 55 mg   Fortified Food Serving Size Estimated Calcium*   [place] milk, rice milk or soy milk, fortified 8 oz 300 mg   Orange juice and other fruit juices, fortified 8 oz 300 mg   Tofu, prepared with calcium 4 oz 205 mg   Waffle, frozen, fortified 2 pieces 200 mg   Oatmeal, fortified 1 packet 140 mg   English muffin, fortified 1 muffin 100 mg   Cereal, fortified 8 oz 100-1,000 mg   Other Serving Size Estimated Calcium*   Mac & cheese, frozen 1 package 325 mg   Pizza, cheese, frozen 1 serving 115 mg   Pudding, chocolate, prepared with 2% milk 4 oz 160 mg   Beans, baked, canned 4 oz 160 mg     *The calcium content listed for most foods is estimated and can vary due to multiple factors. Check the food label to determine how much calcium is in a particular product.  If you read the nutrition label for a food source, it lists the % calcium in that food.  For an 8 oz glass of milk, for example, the label states calcium 30%.  This is equivalent to 300 mg of calcium (multiply the listed number  by 10).   **Table from the [employer]

## 2023-02-28 NOTE — ASSESSMENT & PLAN NOTE
- upon completion of secondary work up for osteoporosis, will likely need treatment for osteoporosis before transplant

## 2023-02-28 NOTE — PROGRESS NOTES
"Subjective:       Patient ID: Michael Urbano is a 42 y.o. male.    Chief Complaint: s/p TIPS    Follow up visit with patient for TIPS revision on 1/25/23.  PMH liver cirrhosis secondary to AMEZCUA, status post urgent TIPS placement for esophageal variceal bleeding with follow up TIPS doppler demonstrating decreased velocities, concerning for malfunction.  1/25/23 TIPS revision with placement of a Viatorr stent, which extended further into the right hepatic vein.  Post TIPS revision portogram demonstrated increased hepatopetal flow through the TIPS with reduction of portosystemic gradient from 16 mmHg to 10 mmHg.  Pt had follow up US this morning.  Pt has c/o nausea with medications.  He reports two episodes of vomiting 1-2 weeks ago, denies hemoptysis or hematemesis.  Pt reports mild encephalopathy after the procedure "delayed processing" which resolved with increasing lactulose.  Pt has been able to decreased his lactulose from tid to bid.  Pt denies any fever, chills, CP, SOB, abdominal pain or distention, diarrhea, bleeding, melena.      Review of Systems   Constitutional:  Negative for chills, diaphoresis, fatigue, fever and unexpected weight change.   Respiratory:  Negative for cough and shortness of breath.    Cardiovascular:  Negative for chest pain and leg swelling.   Gastrointestinal:  Positive for nausea and vomiting (twice, no blood.  1-2 weeks ago.). Negative for abdominal distention, abdominal pain and blood in stool.   Neurological:  Negative for dizziness, syncope and weakness.   Psychiatric/Behavioral:  Positive for confusion (now resolved after starting lactulose). Negative for hallucinations and sleep disturbance.        Objective:      Physical Exam  Vitals and nursing note reviewed.   Constitutional:       Appearance: He is well-developed.   HENT:      Head: Normocephalic and atraumatic.   Eyes:      Pupils: Pupils are equal, round, and reactive to light.   Neck:      Thyroid: No thyromegaly.     "  Trachea: No tracheal deviation.   Cardiovascular:      Rate and Rhythm: Normal rate and regular rhythm.      Heart sounds: No murmur heard.  Pulmonary:      Effort: Pulmonary effort is normal.      Breath sounds: Normal breath sounds. No wheezing.   Abdominal:      General: Bowel sounds are normal.      Palpations: Abdomen is soft.      Tenderness: There is no abdominal tenderness.   Musculoskeletal:         General: No tenderness. Normal range of motion.      Cervical back: Normal range of motion and neck supple.   Lymphadenopathy:      Cervical: No cervical adenopathy.   Skin:     General: Skin is warm and dry.   Neurological:      Mental Status: He is alert and oriented to person, place, and time.      Cranial Nerves: No cranial nerve deficit.      Deep Tendon Reflexes: Reflexes are normal and symmetric.   Psychiatric:         Behavior: Behavior normal.       Assessment:       Problem List Items Addressed This Visit       S/P TIPS (transjugular intrahepatic portosystemic shunt) - Primary (Chronic)         Plan:       US Liver with Doppler 2/28/23 following TIPS revision reviewed by Dr. Oakley.  Pt to follow up with hepatology.  Provided clinic phone number if needed in the future.

## 2023-03-03 ENCOUNTER — TELEPHONE (OUTPATIENT)
Dept: ENDOCRINOLOGY | Facility: CLINIC | Age: 43
End: 2023-03-03
Payer: COMMERCIAL

## 2023-03-07 ENCOUNTER — PATIENT MESSAGE (OUTPATIENT)
Dept: ENDOCRINOLOGY | Facility: CLINIC | Age: 43
End: 2023-03-07
Payer: COMMERCIAL

## 2023-03-09 ENCOUNTER — TELEPHONE (OUTPATIENT)
Dept: ENDOCRINOLOGY | Facility: CLINIC | Age: 43
End: 2023-03-09
Payer: COMMERCIAL

## 2023-03-09 NOTE — TELEPHONE ENCOUNTER
Spoke to the patient about his blood test results. He was offered reclast infusion for management of osteoporosis. He wants to talk to his wife regarding it and will reach back to us about it.

## 2023-03-25 ENCOUNTER — PATIENT MESSAGE (OUTPATIENT)
Dept: ENDOCRINOLOGY | Facility: CLINIC | Age: 43
End: 2023-03-25
Payer: COMMERCIAL

## 2023-03-27 ENCOUNTER — OFFICE VISIT (OUTPATIENT)
Dept: TRANSPLANT | Facility: CLINIC | Age: 43
End: 2023-03-27
Attending: INTERNAL MEDICINE
Payer: COMMERCIAL

## 2023-03-27 VITALS
BODY MASS INDEX: 30.78 KG/M2 | DIASTOLIC BLOOD PRESSURE: 59 MMHG | TEMPERATURE: 97 F | HEIGHT: 65 IN | SYSTOLIC BLOOD PRESSURE: 125 MMHG | HEART RATE: 86 BPM | RESPIRATION RATE: 16 BRPM | OXYGEN SATURATION: 99 % | WEIGHT: 184.75 LBS

## 2023-03-27 DIAGNOSIS — K75.81 LIVER CIRRHOSIS SECONDARY TO NASH: Primary | Chronic | ICD-10-CM

## 2023-03-27 DIAGNOSIS — K74.60 LIVER CIRRHOSIS SECONDARY TO NASH: Primary | Chronic | ICD-10-CM

## 2023-03-27 DIAGNOSIS — Z95.828 S/P TIPS (TRANSJUGULAR INTRAHEPATIC PORTOSYSTEMIC SHUNT): Chronic | ICD-10-CM

## 2023-03-27 PROCEDURE — 3074F PR MOST RECENT SYSTOLIC BLOOD PRESSURE < 130 MM HG: ICD-10-PCS | Mod: CPTII,S$GLB,TXP, | Performed by: INTERNAL MEDICINE

## 2023-03-27 PROCEDURE — 4010F PR ACE/ARB THEARPY RXD/TAKEN: ICD-10-PCS | Mod: CPTII,S$GLB,TXP, | Performed by: INTERNAL MEDICINE

## 2023-03-27 PROCEDURE — 3008F BODY MASS INDEX DOCD: CPT | Mod: CPTII,S$GLB,TXP, | Performed by: INTERNAL MEDICINE

## 2023-03-27 PROCEDURE — 4010F ACE/ARB THERAPY RXD/TAKEN: CPT | Mod: CPTII,S$GLB,TXP, | Performed by: INTERNAL MEDICINE

## 2023-03-27 PROCEDURE — 1159F PR MEDICATION LIST DOCUMENTED IN MEDICAL RECORD: ICD-10-PCS | Mod: CPTII,S$GLB,TXP, | Performed by: INTERNAL MEDICINE

## 2023-03-27 PROCEDURE — 99999 PR PBB SHADOW E&M-EST. PATIENT-LVL IV: CPT | Mod: PBBFAC,TXP,, | Performed by: INTERNAL MEDICINE

## 2023-03-27 PROCEDURE — 99999 PR PBB SHADOW E&M-EST. PATIENT-LVL IV: ICD-10-PCS | Mod: PBBFAC,TXP,, | Performed by: INTERNAL MEDICINE

## 2023-03-27 PROCEDURE — 1160F RVW MEDS BY RX/DR IN RCRD: CPT | Mod: CPTII,S$GLB,TXP, | Performed by: INTERNAL MEDICINE

## 2023-03-27 PROCEDURE — 3044F PR MOST RECENT HEMOGLOBIN A1C LEVEL <7.0%: ICD-10-PCS | Mod: CPTII,S$GLB,TXP, | Performed by: INTERNAL MEDICINE

## 2023-03-27 PROCEDURE — 1160F PR REVIEW ALL MEDS BY PRESCRIBER/CLIN PHARMACIST DOCUMENTED: ICD-10-PCS | Mod: CPTII,S$GLB,TXP, | Performed by: INTERNAL MEDICINE

## 2023-03-27 PROCEDURE — 3078F DIAST BP <80 MM HG: CPT | Mod: CPTII,S$GLB,TXP, | Performed by: INTERNAL MEDICINE

## 2023-03-27 PROCEDURE — 3044F HG A1C LEVEL LT 7.0%: CPT | Mod: CPTII,S$GLB,TXP, | Performed by: INTERNAL MEDICINE

## 2023-03-27 PROCEDURE — 3008F PR BODY MASS INDEX (BMI) DOCUMENTED: ICD-10-PCS | Mod: CPTII,S$GLB,TXP, | Performed by: INTERNAL MEDICINE

## 2023-03-27 PROCEDURE — 99213 PR OFFICE/OUTPT VISIT, EST, LEVL III, 20-29 MIN: ICD-10-PCS | Mod: S$GLB,TXP,, | Performed by: INTERNAL MEDICINE

## 2023-03-27 PROCEDURE — 99213 OFFICE O/P EST LOW 20 MIN: CPT | Mod: S$GLB,TXP,, | Performed by: INTERNAL MEDICINE

## 2023-03-27 PROCEDURE — 3078F PR MOST RECENT DIASTOLIC BLOOD PRESSURE < 80 MM HG: ICD-10-PCS | Mod: CPTII,S$GLB,TXP, | Performed by: INTERNAL MEDICINE

## 2023-03-27 PROCEDURE — 3074F SYST BP LT 130 MM HG: CPT | Mod: CPTII,S$GLB,TXP, | Performed by: INTERNAL MEDICINE

## 2023-03-27 PROCEDURE — 1159F MED LIST DOCD IN RCRD: CPT | Mod: CPTII,S$GLB,TXP, | Performed by: INTERNAL MEDICINE

## 2023-03-27 NOTE — PROGRESS NOTES
Transplant Hepatology  Liver Transplant Recipient Evaluation      Consultation started: 3/27/2023 at 9:57 AM     Original Referring Provider: Alexi Leonardo  Current Corresponding Physician: Alexi SMILEY Native Liver Diagnosis: Cirrhosis: Other, Specify - unspecified    Reason for Visit: evaluation for liver transplant     Subjective:     Michael Urbano is a 42 y.o. 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  His major complication been massive gastrointestinal bleeding from portal hypertension.  He has had a tips shunt and has had no further bleeding.  This evaluated for transplant but with a meld score of 12 was thought that the risks of transplant currently await the benefits.  He has required a tips revision.  Does have intermittent issues with in cephalopathy.  I have instructed him to titrate his lactulose to have 3-4 loose bowel movements daily.  Review of Systems   Constitutional:  Negative for activity change, appetite change, chills, fatigue and unexpected weight change.   HENT:  Negative for congestion, facial swelling and tinnitus.    Eyes:  Negative for visual disturbance.   Respiratory:  Negative for cough, shortness of breath and wheezing.    Cardiovascular:  Negative for chest pain and palpitations.   Gastrointestinal:  Negative for abdominal distention.   Genitourinary:  Negative for dysuria.   Musculoskeletal:  Negative for arthralgias, joint swelling and myalgias.   Neurological:  Negative for syncope and headaches.   Hematological:  Does not bruise/bleed easily.   Psychiatric/Behavioral:  Positive for decreased concentration. Negative for confusion.    Past Surgical History:   Procedure Laterality Date    COLONOSCOPY  09/07/2022    ESOPHAGOGASTRODUODENOSCOPY  09/07/2022    FRACTURE SURGERY      LEFT ARM    IR TIPS      10/9/2022    LAPAROSCOPIC CHOLECYSTECTOMY  11/18/2022     Current Outpatient Medications   Medication Sig    cholecalciferol, vitamin  D3, 100 mcg (4,000 unit) Cap capsule Take 1 capsule (4,000 Units total) by mouth once daily.    EScitalopram oxalate (LEXAPRO) 20 MG tablet Take 20 mg by mouth once daily.    flash glucose sensor (FREESTYLE HALLIE 14 DAY SENSOR) Kit 1 kit by Misc.(Non-Drug; Combo Route) route every 14 (fourteen) days.    lactulose (CHRONULAC) 10 gram/15 mL solution Take 20 g by mouth 3 (three) times daily.    losartan (COZAAR) 50 MG tablet Take 50 mg by mouth every morning.    metFORMIN (GLUCOPHAGE) 1000 MG tablet Take 1,000 mg by mouth 2 (two) times daily.    metoprolol tartrate (LOPRESSOR) 25 MG tablet Take 12.5 mg by mouth 2 (two) times daily.    pantoprazole (PROTONIX) 40 MG tablet Take 40 mg by mouth 2 (two) times daily.    rosuvastatin (CRESTOR) 5 MG tablet Take 5 mg by mouth every morning.    spironolactone (ALDACTONE) 50 MG tablet Take 50 mg by mouth once daily.    XIFAXAN 550 mg Tab Take 550 mg by mouth 2 (two) times daily.     No current facility-administered medications for this visit.       Objective:   Physical Exam  Constitutional:       Appearance: He is well-developed.   Eyes:      General: No scleral icterus.  Cardiovascular:      Rate and Rhythm: Normal rate and regular rhythm.      Heart sounds: Normal heart sounds.   Pulmonary:      Effort: Pulmonary effort is normal. No respiratory distress.      Breath sounds: Normal breath sounds. No wheezing.   Abdominal:      General: Bowel sounds are normal. There is no distension.      Palpations: Abdomen is soft. There is no mass.      Tenderness: There is no abdominal tenderness. There is no rebound.   Musculoskeletal:         General: Normal range of motion.   Lymphadenopathy:      Cervical: No cervical adenopathy.   Skin:     General: Skin is warm and dry.   Neurological:      Mental Status: He is alert and oriented to person, place, and time.     MELD-Na score: 12 at 1/25/2023  9:19 AM  MELD score: 12 at 1/25/2023  9:19 AM  Calculated from:  Serum Creatinine: 0.8 mg/dL  (Using min of 1 mg/dL) at 1/25/2023  9:19 AM  Serum Sodium: 138 mmol/L (Using max of 137 mmol/L) at 1/25/2023  9:19 AM  Total Bilirubin: 1.8 mg/dL at 1/25/2023  9:19 AM  INR(ratio): 1.3 at 1/25/2023  9:19 AM  Age: 42 years  Lab Results   Component Value Date     (H) 01/25/2023    BUN 10 01/25/2023    CREATININE 0.8 01/25/2023    CALCIUM 8.9 01/25/2023     01/25/2023    K 4.3 01/25/2023     01/25/2023    PROT 6.7 01/25/2023    CO2 24 01/25/2023    WBC 1.55 (LL) 01/25/2023    RBC 3.71 (L) 01/25/2023    HGB 8.4 (L) 01/25/2023    HCT 27.8 (L) 01/25/2023    PLT 59 (L) 01/25/2023     Lab Results   Component Value Date    CHOL 122 02/28/2023    TRIG 115 02/28/2023    HDL 42 02/28/2023    CHOLHDL 34.4 02/28/2023    TOTALCHOLEST 2.9 02/28/2023    ALBUMIN 4.0 02/28/2023    BILITOT 1.8 (H) 01/25/2023    AST 26 01/25/2023    ALT 19 01/25/2023    ALKPHOS 121 01/25/2023    LABPROT 13.4 (H) 01/25/2023    INR 1.3 (H) 01/25/2023       Diagnostics:     1. Liver cirrhosis secondary to AMEZCUA    2. S/P TIPS (transjugular intrahepatic portosystemic shunt)        Transplant Hepatology - Candidacy   Assessment/Plan:     Transplant Candidacy: Michael Urbano is a 42 y.o. male with ESLD secondary to nonalcoholic steatohepatitis here to initiate his liver transplant evaluation.  He is currently too early for liver transplantation.  I will continue to follow him every 3 months to monitor his indications for transplantation.  He has been advised on increasing his lactulose to have 3-4 the loose bowel movements daily.    Travis Rivas MD         [facility] Patient Status  Functional Status: 90% - Able to carry on normal activity: minor symptoms of disease  Physical Capacity: No Limitations    Patient on life support: No  Diabetes: Type II  Any previous malignancy: No  Neoadjuvant Therapy: no  Has patient ever had a dx of HCC: yes  Previous Abdominal Surgery: no  Spontaneous Bacterial Peritonitis: no  History of Portal Vein  Thrombosis: no  Transjugular Intrahepatic Portosystemic Shunt: yes

## 2023-03-27 NOTE — LETTER
March 27, 2023        Alexi Leonardo  [address] [place], [place] [address] [place] [address]  Phone: [phone]  Fax: [phone]             [facility]  [address]  [facility] [address]  Phone: [phone]   Patient: Michael Urbano   MR Number: [id number]   YOB: 1980   Date of Visit: 3/27/2023       Dear Dr. Alexi Leonardo    Thank you for referring Michael Urbano to me for evaluation. Attached you will find relevant portions of my assessment and plan of care.    If you have questions, please do not hesitate to call me. I look forward to following Michael Urbano along with you.    Sincerely,    Travis Rivas MD    Enclosure    If you would like to receive this communication electronically, please contact [email or web address] or [phone] to request [employer] access.    [employer] is a tool which provides read-only access to select patient information with whom you have a relationship. Its easy to use and provides real time access to review your patients record including encounter summaries, notes, results, and demographic information.    If you feel you have received this communication in error or would no longer like to receive these types of communications, please e-mail [email or web address]

## 2023-03-31 ENCOUNTER — TELEPHONE (OUTPATIENT)
Dept: ENDOCRINOLOGY | Facility: CLINIC | Age: 43
End: 2023-03-31
Payer: COMMERCIAL

## 2023-03-31 DIAGNOSIS — E11.9 TYPE 2 DIABETES MELLITUS WITHOUT COMPLICATION, WITHOUT LONG-TERM CURRENT USE OF INSULIN: Primary | ICD-10-CM

## 2023-03-31 RX ORDER — SEMAGLUTIDE 1.34 MG/ML
INJECTION, SOLUTION SUBCUTANEOUS
Qty: 3.75 ML | Refills: 0 | Status: SHIPPED | OUTPATIENT
Start: 2023-04-28 | End: 2023-06-22

## 2023-03-31 NOTE — TELEPHONE ENCOUNTER
Spoke to the patient about his blood sugars concern, since prandial sugars are above goal, will initiate ozempic 0.25 mg weekly for a month followed by 0.5 mg weekly in the patient.

## 2023-04-06 ENCOUNTER — PATIENT MESSAGE (OUTPATIENT)
Dept: ENDOCRINOLOGY | Facility: CLINIC | Age: 43
End: 2023-04-06
Payer: COMMERCIAL

## 2023-04-06 NOTE — TELEPHONE ENCOUNTER
,    Patient Michael Urbano #[id number] blood sugar is ranging 300-350 he had a steroid shot on yesterday. He is concerned because his sugars won't go down. He said he did have a low yesterday night 140-120. Patient is not on insulin.

## 2023-04-14 ENCOUNTER — TELEPHONE (OUTPATIENT)
Dept: ENDOCRINOLOGY | Facility: CLINIC | Age: 43
End: 2023-04-14
Payer: COMMERCIAL

## 2023-06-22 ENCOUNTER — LAB VISIT (OUTPATIENT)
Dept: LAB | Facility: HOSPITAL | Age: 43
End: 2023-06-22
Attending: INTERNAL MEDICINE
Payer: COMMERCIAL

## 2023-06-22 ENCOUNTER — OFFICE VISIT (OUTPATIENT)
Dept: TRANSPLANT | Facility: CLINIC | Age: 43
End: 2023-06-22
Attending: INTERNAL MEDICINE
Payer: COMMERCIAL

## 2023-06-22 VITALS
RESPIRATION RATE: 16 BRPM | HEIGHT: 65 IN | SYSTOLIC BLOOD PRESSURE: 112 MMHG | HEART RATE: 76 BPM | WEIGHT: 181 LBS | OXYGEN SATURATION: 100 % | DIASTOLIC BLOOD PRESSURE: 53 MMHG | BODY MASS INDEX: 30.16 KG/M2 | TEMPERATURE: 97 F

## 2023-06-22 DIAGNOSIS — Z76.82 ORGAN TRANSPLANT CANDIDATE: ICD-10-CM

## 2023-06-22 DIAGNOSIS — K74.60 LIVER CIRRHOSIS SECONDARY TO NASH: Chronic | ICD-10-CM

## 2023-06-22 DIAGNOSIS — I86.4 GASTRIC VARICES: Chronic | ICD-10-CM

## 2023-06-22 DIAGNOSIS — K74.60 HEPATIC CIRRHOSIS, UNSPECIFIED HEPATIC CIRRHOSIS TYPE, UNSPECIFIED WHETHER ASCITES PRESENT: ICD-10-CM

## 2023-06-22 DIAGNOSIS — I85.10 SECONDARY ESOPHAGEAL VARICES WITHOUT BLEEDING: Chronic | ICD-10-CM

## 2023-06-22 DIAGNOSIS — Z95.828 S/P TIPS (TRANSJUGULAR INTRAHEPATIC PORTOSYSTEMIC SHUNT): Chronic | ICD-10-CM

## 2023-06-22 DIAGNOSIS — K75.81 LIVER CIRRHOSIS SECONDARY TO NASH: Chronic | ICD-10-CM

## 2023-06-22 LAB
ALBUMIN SERPL BCP-MCNC: 3.5 G/DL (ref 3.5–5.2)
ALP SERPL-CCNC: 106 U/L (ref 55–135)
ALT SERPL W/O P-5'-P-CCNC: 15 U/L (ref 10–44)
AMPHET+METHAMPHET UR QL: NEGATIVE
ANION GAP SERPL CALC-SCNC: 7 MMOL/L (ref 8–16)
ANISOCYTOSIS BLD QL SMEAR: SLIGHT
AST SERPL-CCNC: 21 U/L (ref 10–40)
BARBITURATES UR QL SCN>200 NG/ML: NEGATIVE
BASOPHILS NFR BLD: 2 % (ref 0–1.9)
BENZODIAZ UR QL SCN>200 NG/ML: NEGATIVE
BILIRUB SERPL-MCNC: 1.6 MG/DL (ref 0.1–1)
BUN SERPL-MCNC: 12 MG/DL (ref 6–20)
BZE UR QL SCN: NEGATIVE
CALCIUM SERPL-MCNC: 8.7 MG/DL (ref 8.7–10.5)
CANNABINOIDS UR QL SCN: NEGATIVE
CHLORIDE SERPL-SCNC: 109 MMOL/L (ref 95–110)
CO2 SERPL-SCNC: 26 MMOL/L (ref 23–29)
CREAT SERPL-MCNC: 0.8 MG/DL (ref 0.5–1.4)
CREAT UR-MCNC: 162 MG/DL (ref 23–375)
DIFFERENTIAL METHOD: ABNORMAL
EOSINOPHIL NFR BLD: 3 % (ref 0–8)
ERYTHROCYTE [DISTWIDTH] IN BLOOD BY AUTOMATED COUNT: 15.6 % (ref 11.5–14.5)
EST. GFR  (NO RACE VARIABLE): >60 ML/MIN/1.73 M^2
ETHANOL UR-MCNC: <10 MG/DL
GLUCOSE SERPL-MCNC: 120 MG/DL (ref 70–110)
HCT VFR BLD AUTO: 30.3 % (ref 40–54)
HGB BLD-MCNC: 9.3 G/DL (ref 14–18)
HYPOCHROMIA BLD QL SMEAR: ABNORMAL
IMM GRANULOCYTES # BLD AUTO: ABNORMAL K/UL (ref 0–0.04)
IMM GRANULOCYTES NFR BLD AUTO: ABNORMAL % (ref 0–0.5)
INR PPP: 1.3 (ref 0.8–1.2)
LYMPHOCYTES NFR BLD: 38 % (ref 18–48)
MCH RBC QN AUTO: 24.7 PG (ref 27–31)
MCHC RBC AUTO-ENTMCNC: 30.7 G/DL (ref 32–36)
MCV RBC AUTO: 80 FL (ref 82–98)
METHADONE UR QL SCN>300 NG/ML: NEGATIVE
MONOCYTES NFR BLD: 12 % (ref 4–15)
NEUTROPHILS NFR BLD: 44 % (ref 38–73)
NEUTS BAND NFR BLD MANUAL: 1 %
NRBC BLD-RTO: 0 /100 WBC
OPIATES UR QL SCN: NEGATIVE
OVALOCYTES BLD QL SMEAR: ABNORMAL
PCP UR QL SCN>25 NG/ML: NEGATIVE
PLATELET # BLD AUTO: 56 K/UL (ref 150–450)
PMV BLD AUTO: 10.5 FL (ref 9.2–12.9)
POIKILOCYTOSIS BLD QL SMEAR: SLIGHT
POLYCHROMASIA BLD QL SMEAR: ABNORMAL
POTASSIUM SERPL-SCNC: 4.6 MMOL/L (ref 3.5–5.1)
PROT SERPL-MCNC: 6.2 G/DL (ref 6–8.4)
PROTHROMBIN TIME: 13.5 SEC (ref 9–12.5)
RBC # BLD AUTO: 3.77 M/UL (ref 4.6–6.2)
SODIUM SERPL-SCNC: 142 MMOL/L (ref 136–145)
SPHEROCYTES BLD QL SMEAR: ABNORMAL
TOXICOLOGY INFORMATION: NORMAL
WBC # BLD AUTO: 1.5 K/UL (ref 3.9–12.7)

## 2023-06-22 PROCEDURE — 3008F BODY MASS INDEX DOCD: CPT | Mod: CPTII,S$GLB,TXP, | Performed by: INTERNAL MEDICINE

## 2023-06-22 PROCEDURE — 3044F HG A1C LEVEL LT 7.0%: CPT | Mod: CPTII,S$GLB,TXP, | Performed by: INTERNAL MEDICINE

## 2023-06-22 PROCEDURE — 3044F PR MOST RECENT HEMOGLOBIN A1C LEVEL <7.0%: ICD-10-PCS | Mod: CPTII,S$GLB,TXP, | Performed by: INTERNAL MEDICINE

## 2023-06-22 PROCEDURE — 99999 PR PBB SHADOW E&M-EST. PATIENT-LVL IV: ICD-10-PCS | Mod: PBBFAC,TXP,, | Performed by: INTERNAL MEDICINE

## 2023-06-22 PROCEDURE — 3074F SYST BP LT 130 MM HG: CPT | Mod: CPTII,S$GLB,TXP, | Performed by: INTERNAL MEDICINE

## 2023-06-22 PROCEDURE — 99999 PR PBB SHADOW E&M-EST. PATIENT-LVL IV: CPT | Mod: PBBFAC,TXP,, | Performed by: INTERNAL MEDICINE

## 2023-06-22 PROCEDURE — 3078F DIAST BP <80 MM HG: CPT | Mod: CPTII,S$GLB,TXP, | Performed by: INTERNAL MEDICINE

## 2023-06-22 PROCEDURE — 80307 DRUG TEST PRSMV CHEM ANLYZR: CPT | Mod: TXP | Performed by: INTERNAL MEDICINE

## 2023-06-22 PROCEDURE — 85610 PROTHROMBIN TIME: CPT | Mod: TXP | Performed by: INTERNAL MEDICINE

## 2023-06-22 PROCEDURE — 1159F MED LIST DOCD IN RCRD: CPT | Mod: CPTII,S$GLB,TXP, | Performed by: INTERNAL MEDICINE

## 2023-06-22 PROCEDURE — 99213 PR OFFICE/OUTPT VISIT, EST, LEVL III, 20-29 MIN: ICD-10-PCS | Mod: S$GLB,TXP,, | Performed by: INTERNAL MEDICINE

## 2023-06-22 PROCEDURE — 1159F PR MEDICATION LIST DOCUMENTED IN MEDICAL RECORD: ICD-10-PCS | Mod: CPTII,S$GLB,TXP, | Performed by: INTERNAL MEDICINE

## 2023-06-22 PROCEDURE — 36415 COLL VENOUS BLD VENIPUNCTURE: CPT | Mod: TXP | Performed by: INTERNAL MEDICINE

## 2023-06-22 PROCEDURE — 3008F PR BODY MASS INDEX (BMI) DOCUMENTED: ICD-10-PCS | Mod: CPTII,S$GLB,TXP, | Performed by: INTERNAL MEDICINE

## 2023-06-22 PROCEDURE — 80053 COMPREHEN METABOLIC PANEL: CPT | Mod: NTX | Performed by: INTERNAL MEDICINE

## 2023-06-22 PROCEDURE — 85027 COMPLETE CBC AUTOMATED: CPT | Mod: NTX | Performed by: INTERNAL MEDICINE

## 2023-06-22 PROCEDURE — 80321 ALCOHOLS BIOMARKERS 1OR 2: CPT | Mod: TXP | Performed by: INTERNAL MEDICINE

## 2023-06-22 PROCEDURE — 4010F PR ACE/ARB THEARPY RXD/TAKEN: ICD-10-PCS | Mod: CPTII,S$GLB,TXP, | Performed by: INTERNAL MEDICINE

## 2023-06-22 PROCEDURE — 85007 BL SMEAR W/DIFF WBC COUNT: CPT | Mod: TXP | Performed by: INTERNAL MEDICINE

## 2023-06-22 PROCEDURE — 3078F PR MOST RECENT DIASTOLIC BLOOD PRESSURE < 80 MM HG: ICD-10-PCS | Mod: CPTII,S$GLB,TXP, | Performed by: INTERNAL MEDICINE

## 2023-06-22 PROCEDURE — 4010F ACE/ARB THERAPY RXD/TAKEN: CPT | Mod: CPTII,S$GLB,TXP, | Performed by: INTERNAL MEDICINE

## 2023-06-22 PROCEDURE — 1160F PR REVIEW ALL MEDS BY PRESCRIBER/CLIN PHARMACIST DOCUMENTED: ICD-10-PCS | Mod: CPTII,S$GLB,TXP, | Performed by: INTERNAL MEDICINE

## 2023-06-22 PROCEDURE — 3074F PR MOST RECENT SYSTOLIC BLOOD PRESSURE < 130 MM HG: ICD-10-PCS | Mod: CPTII,S$GLB,TXP, | Performed by: INTERNAL MEDICINE

## 2023-06-22 PROCEDURE — 1160F RVW MEDS BY RX/DR IN RCRD: CPT | Mod: CPTII,S$GLB,TXP, | Performed by: INTERNAL MEDICINE

## 2023-06-22 PROCEDURE — 99213 OFFICE O/P EST LOW 20 MIN: CPT | Mod: S$GLB,TXP,, | Performed by: INTERNAL MEDICINE

## 2023-06-22 NOTE — PROGRESS NOTES
Transplant Hepatology  Liver Transplant Recipient Evaluation      Consultation started: 6/22/2023 at 9:57 AM     Original Referring Provider: Alexi Leonardo  Current Corresponding Physician: Alexi SMILEY Native Liver Diagnosis: Cirrhosis: Other, Specify - unspecified    Reason for Visit: evaluation for liver transplant     Subjective:     Michael Urbano is a 42 y.o. 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  His major complication been massive gastrointestinal bleeding from portal hypertension.  He has had a tips shunt and has had no further bleeding.  He is had no further gastrointestinal bleeding and no symptoms of hepatic encephalopathy or salt water retention.  His most recent meld score is 11 suggesting that he is too early for transplant.  He also describes some lightheadedness.  He will message his primary care physician to ask whether he needs to be on metoprolol any longer.  Review of Systems   Constitutional:  Negative for activity change, appetite change, chills, fatigue and unexpected weight change.   HENT:  Negative for congestion, facial swelling and tinnitus.    Eyes:  Negative for visual disturbance.   Respiratory:  Negative for cough, shortness of breath and wheezing.    Cardiovascular:  Negative for chest pain and palpitations.   Gastrointestinal:  Negative for abdominal distention.   Genitourinary:  Negative for dysuria.   Musculoskeletal:  Negative for arthralgias, joint swelling and myalgias.   Neurological:  Negative for syncope and headaches.   Hematological:  Does not bruise/bleed easily.   Psychiatric/Behavioral:  Negative for confusion and decreased concentration.    Past Surgical History:   Procedure Laterality Date    COLONOSCOPY  09/07/2022    ESOPHAGOGASTRODUODENOSCOPY  09/07/2022    FRACTURE SURGERY      LEFT ARM    IR TIPS      10/9/2022    LAPAROSCOPIC CHOLECYSTECTOMY  11/18/2022     Current Outpatient Medications   Medication Sig     cholecalciferol, vitamin D3, 100 mcg (4,000 unit) Cap capsule Take 1 capsule (4,000 Units total) by mouth once daily.    EScitalopram oxalate (LEXAPRO) 20 MG tablet Take 20 mg by mouth once daily.    flash glucose sensor (FREESTYLE HALLIE 14 DAY SENSOR) Kit 1 kit by Misc.(Non-Drug; Combo Route) route every 14 (fourteen) days.    lactulose (CHRONULAC) 10 gram/15 mL solution Take 20 g by mouth 3 (three) times daily.    losartan (COZAAR) 50 MG tablet Take 50 mg by mouth every morning.    metFORMIN (GLUCOPHAGE) 1000 MG tablet Take 1,000 mg by mouth 2 (two) times daily.    metoprolol tartrate (LOPRESSOR) 25 MG tablet Take 12.5 mg by mouth 2 (two) times daily.    pantoprazole (PROTONIX) 40 MG tablet Take 40 mg by mouth 2 (two) times daily.    rosuvastatin (CRESTOR) 5 MG tablet Take 5 mg by mouth every morning.    spironolactone (ALDACTONE) 50 MG tablet Take 50 mg by mouth once daily.    XIFAXAN 550 mg Tab Take 550 mg by mouth 2 (two) times daily.     No current facility-administered medications for this visit.       Objective:   Physical Exam  Constitutional:       Appearance: He is well-developed.   Eyes:      General: No scleral icterus.  Cardiovascular:      Rate and Rhythm: Normal rate and regular rhythm.      Heart sounds: Normal heart sounds.   Pulmonary:      Effort: Pulmonary effort is normal. No respiratory distress.      Breath sounds: Normal breath sounds. No wheezing.   Abdominal:      General: Bowel sounds are normal. There is no distension.      Palpations: Abdomen is soft. There is no mass.      Tenderness: There is no abdominal tenderness. There is no rebound.   Musculoskeletal:         General: Normal range of motion.   Lymphadenopathy:      Cervical: No cervical adenopathy.   Skin:     General: Skin is warm and dry.   Neurological:      Mental Status: He is alert and oriented to person, place, and time.     MELD-Na: 11 at 6/22/2023  8:30 AM  MELD: 11 at 6/22/2023  8:30 AM  Calculated from:  Serum  Creatinine: 0.8 mg/dL (Using min of 1 mg/dL) at 6/22/2023  8:30 AM  Serum Sodium: 142 mmol/L (Using max of 137 mmol/L) at 6/22/2023  8:30 AM  Total Bilirubin: 1.6 mg/dL at 6/22/2023  8:30 AM  INR(ratio): 1.3 at 6/22/2023  8:30 AM    Lab Results   Component Value Date     (H) 06/22/2023    BUN 12 06/22/2023    CREATININE 0.8 06/22/2023    CALCIUM 8.7 06/22/2023     06/22/2023    K 4.6 06/22/2023     06/22/2023    PROT 6.2 06/22/2023    CO2 26 06/22/2023    WBC 1.50 (LL) 06/22/2023    RBC 3.77 (L) 06/22/2023    HGB 9.3 (L) 06/22/2023    HCT 30.3 (L) 06/22/2023    PLT 56 (L) 06/22/2023     Lab Results   Component Value Date    CHOL 122 02/28/2023    TRIG 115 02/28/2023    HDL 42 02/28/2023    CHOLHDL 34.4 02/28/2023    TOTALCHOLEST 2.9 02/28/2023    ALBUMIN 3.5 06/22/2023    ALBUMIN 4.0 02/28/2023    BILITOT 1.6 (H) 06/22/2023    AST 21 06/22/2023    ALT 15 06/22/2023    ALKPHOS 106 06/22/2023    LABPROT 13.5 (H) 06/22/2023    INR 1.3 (H) 06/22/2023       Diagnostics:     1. Hepatic cirrhosis, unspecified hepatic cirrhosis type, unspecified whether ascites present    2. Organ transplant candidate    3. Liver cirrhosis secondary to AMEZCUA    4. S/P TIPS (transjugular intrahepatic portosystemic shunt)    5. Secondary esophageal varices without bleeding    6. Gastric varices        Transplant Hepatology - Candidacy   Assessment/Plan:     Transplant Candidacy: Michael Urbano is a 42 y.o. male with ESLD secondary to nonalcoholic steatohepatitis .  He is currently too early for liver transplantation. He will be getting a Doppler ultrasound to check his shunt patency.    I will have him return to clinic in 6 months' time to monitor his indications for transplantation.  MD ABIGAIL Hicks Patient Status  Functional Status: 90% - Able to carry on normal activity: minor symptoms of disease  Physical Capacity: No Limitations    Patient on life support: No  Diabetes: Type II  Any previous  malignancy: No  Neoadjuvant Therapy: no  Has patient ever had a dx of HCC: yes  Previous Abdominal Surgery: no  Spontaneous Bacterial Peritonitis: no  History of Portal Vein Thrombosis: no  Transjugular Intrahepatic Portosystemic Shunt: yes

## 2023-06-22 NOTE — LETTER
June 22, 2023        Alexi Leonardo  [address] [place] [address] [place] [address]  Phone: [phone]  Fax: [phone]             [facility] Fl  [address]  [facility] [address]  Phone: [phone]   Patient: Michael Urbano   MR Number: [id number]   YOB: 1980   Date of Visit: 6/22/2023       Dear Dr. Alexi Leonardo    Thank you for referring Michael Urbano to me for evaluation. Attached you will find relevant portions of my assessment and plan of care.    If you have questions, please do not hesitate to call me. I look forward to following Michael Urbano along with you.    Sincerely,    Travis Rivas MD    Enclosure    If you would like to receive this communication electronically, please contact [email or web address] or [phone] to request [employer] Link access.    [employer] is a tool which provides read-only access to select patient information with whom you have a relationship. Its easy to use and provides real time access to review your patients record including encounter summaries, notes, results, and demographic information.    If you feel you have received this communication in error or would no longer like to receive these types of communications, please e-mail [email or web address]

## 2023-06-26 LAB
CLINICAL BIOCHEMIST REVIEW: NORMAL
PLPETH BLD-MCNC: <10 NG/ML
POPETH BLD-MCNC: <20 NG/ML

## 2023-06-29 ENCOUNTER — PATIENT MESSAGE (OUTPATIENT)
Dept: TRANSPLANT | Facility: CLINIC | Age: 43
End: 2023-06-29
Payer: COMMERCIAL

## 2023-07-07 ENCOUNTER — PATIENT MESSAGE (OUTPATIENT)
Dept: TRANSPLANT | Facility: CLINIC | Age: 43
End: 2023-07-07
Payer: COMMERCIAL

## 2023-08-21 ENCOUNTER — TELEPHONE (OUTPATIENT)
Dept: HEPATOLOGY | Facility: CLINIC | Age: 43
End: 2023-08-21
Payer: COMMERCIAL

## 2023-08-21 ENCOUNTER — TELEPHONE (OUTPATIENT)
Dept: TRANSPLANT | Facility: CLINIC | Age: 43
End: 2023-08-21
Payer: COMMERCIAL

## 2023-08-21 ENCOUNTER — PATIENT MESSAGE (OUTPATIENT)
Dept: TRANSPLANT | Facility: CLINIC | Age: 43
End: 2023-08-21
Payer: COMMERCIAL

## 2023-08-21 NOTE — TELEPHONE ENCOUNTER
Patient's phone call returned.  Discussed recommended follow-up in transplant clinic.  Patient reports that he attempted to send message via [facility] portal but uncertain if it went to correct staff, so he scheduled appt himself on the portal.  Informed patient that f/u recommended in 6 months instead of 3 months (due Dec 2023).  He voiced understanding.  Appt scheduled by patient scheduled in Hepatology.  Appt rescheduled to Transplant clinic.  Appt reminder mailed.  Patient agrees to have labs drawn locally a week prior to appt.  He denies any questions/ concerns at this time.    ==============================================================================      ----- Message from Rosaura Laureano sent at 8/21/2023 12:24 PM     ----- Message -----  From: Tiesha Salas  Sent: 8/21/2023  12:08 PM CDT  To: [facility] Pre-Liver Transplant Non-Clinical  Subject: patient advice                                     Name of Caller:  Michael Urbano     Contact Preference:  [phone]    Nature of Call:  Patient requesting a call back has a question about care and appts

## 2023-11-13 ENCOUNTER — TELEPHONE (OUTPATIENT)
Dept: TRANSPLANT | Facility: CLINIC | Age: 43
End: 2023-11-13
Payer: COMMERCIAL

## 2023-11-13 NOTE — TELEPHONE ENCOUNTER
----- Message from Rosaura Laureano sent at 11/13/2023 12:29 PM CST -----    Called [phone] (Home Phone) and sp to pt about appt on 12/4; MD has booked out. Pt has been re/dora'ed for 12/14.  .

## 2024-01-02 ENCOUNTER — OFFICE VISIT (OUTPATIENT)
Dept: TRANSPLANT | Facility: CLINIC | Age: 44
End: 2024-01-02
Attending: INTERNAL MEDICINE
Payer: COMMERCIAL

## 2024-01-02 ENCOUNTER — HOSPITAL ENCOUNTER (OUTPATIENT)
Dept: RADIOLOGY | Facility: HOSPITAL | Age: 44
Discharge: HOME | End: 2024-01-02
Attending: INTERNAL MEDICINE
Payer: COMMERCIAL

## 2024-01-02 ENCOUNTER — TELEPHONE (OUTPATIENT)
Dept: TRANSPLANT | Facility: CLINIC | Age: 44
End: 2024-01-02

## 2024-01-02 VITALS
RESPIRATION RATE: 18 BRPM | HEIGHT: 66 IN | WEIGHT: 186.94 LBS | DIASTOLIC BLOOD PRESSURE: 56 MMHG | SYSTOLIC BLOOD PRESSURE: 124 MMHG | BODY MASS INDEX: 30.04 KG/M2 | OXYGEN SATURATION: 100 % | TEMPERATURE: 97 F | HEART RATE: 81 BPM

## 2024-01-02 DIAGNOSIS — I85.10 SECONDARY ESOPHAGEAL VARICES WITHOUT BLEEDING: Chronic | ICD-10-CM

## 2024-01-02 DIAGNOSIS — I86.4 GASTRIC VARICES: Chronic | ICD-10-CM

## 2024-01-02 DIAGNOSIS — K75.81 LIVER CIRRHOSIS SECONDARY TO NASH: Chronic | ICD-10-CM

## 2024-01-02 DIAGNOSIS — Z76.82 ORGAN TRANSPLANT CANDIDATE: ICD-10-CM

## 2024-01-02 DIAGNOSIS — Z95.828 S/P TIPS (TRANSJUGULAR INTRAHEPATIC PORTOSYSTEMIC SHUNT): Chronic | ICD-10-CM

## 2024-01-02 DIAGNOSIS — K74.60 HEPATIC CIRRHOSIS, UNSPECIFIED HEPATIC CIRRHOSIS TYPE, UNSPECIFIED WHETHER ASCITES PRESENT: ICD-10-CM

## 2024-01-02 DIAGNOSIS — K74.60 LIVER CIRRHOSIS SECONDARY TO NASH: Chronic | ICD-10-CM

## 2024-01-02 LAB
AMPHET+METHAMPHET UR QL: NEGATIVE
BARBITURATES UR QL SCN>200 NG/ML: NEGATIVE
BENZODIAZ UR QL SCN>200 NG/ML: NEGATIVE
BZE UR QL SCN: NEGATIVE
CANNABINOIDS UR QL SCN: NEGATIVE
CREAT UR-MCNC: 135 MG/DL (ref 23–375)
ETHANOL UR-MCNC: <10 MG/DL
METHADONE UR QL SCN>300 NG/ML: NEGATIVE
OPIATES UR QL SCN: NEGATIVE
PCP UR QL SCN>25 NG/ML: NEGATIVE
TOXICOLOGY INFORMATION: NORMAL

## 2024-01-02 PROCEDURE — 76700 US EXAM ABDOM COMPLETE: CPT | Mod: 26,59,TXP, | Performed by: RADIOLOGY

## 2024-01-02 PROCEDURE — 99999 PR PBB SHADOW E&M-EST. PATIENT-LVL IV: CPT | Mod: PBBFAC,TXP,, | Performed by: INTERNAL MEDICINE

## 2024-01-02 PROCEDURE — 99213 OFFICE O/P EST LOW 20 MIN: CPT | Mod: S$GLB,TXP,, | Performed by: INTERNAL MEDICINE

## 2024-01-02 PROCEDURE — 1159F MED LIST DOCD IN RCRD: CPT | Mod: CPTII,S$GLB,TXP, | Performed by: INTERNAL MEDICINE

## 2024-01-02 PROCEDURE — 3008F BODY MASS INDEX DOCD: CPT | Mod: CPTII,S$GLB,TXP, | Performed by: INTERNAL MEDICINE

## 2024-01-02 PROCEDURE — 3074F SYST BP LT 130 MM HG: CPT | Mod: CPTII,S$GLB,TXP, | Performed by: INTERNAL MEDICINE

## 2024-01-02 PROCEDURE — 93975 VASCULAR STUDY: CPT | Mod: 26,TXP,, | Performed by: RADIOLOGY

## 2024-01-02 PROCEDURE — 1160F RVW MEDS BY RX/DR IN RCRD: CPT | Mod: CPTII,S$GLB,TXP, | Performed by: INTERNAL MEDICINE

## 2024-01-02 PROCEDURE — 80307 DRUG TEST PRSMV CHEM ANLYZR: CPT | Mod: TXP | Performed by: INTERNAL MEDICINE

## 2024-01-02 PROCEDURE — 3078F DIAST BP <80 MM HG: CPT | Mod: CPTII,S$GLB,TXP, | Performed by: INTERNAL MEDICINE

## 2024-01-02 PROCEDURE — 93975 VASCULAR STUDY: CPT | Mod: TC,TXP

## 2024-01-02 NOTE — TELEPHONE ENCOUNTER
Call received from [facility], Hematology lab, with report of critical wbc, 1.25. . Result reviewed with patient, who reports feeling well. Denies recent illness or fever.      MD notified via Secure Chat.

## 2024-01-02 NOTE — PROGRESS NOTES
Transplant Hepatology  Liver Transplant Recipient Evaluation      Consultation started: 1/2/2024 at 9:57 AM     Original Referring Provider: Alexi Leonardo  Current Corresponding Physician: Alexi SMILEY Native Liver Diagnosis: Cirrhosis: Other, Specify - unspecified    Reason for Visit: evaluation for liver transplant     Subjective:     Michael Urbano is a 43 y.o. 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  His major complication been massive gastrointestinal bleeding from portal hypertension.  He has had a tips shunt and has had no further bleeding.  A recent Doppler ultrasound showed a functioning tips shunt.  He has no salt water retention.  He is very well controlled hepatic encephalopathy.  He would blood work today the results of which are pending.  Review of Systems   Constitutional:  Negative for activity change, appetite change, chills, fatigue and unexpected weight change.   HENT:  Negative for congestion, facial swelling and tinnitus.    Eyes:  Negative for visual disturbance.   Respiratory:  Negative for cough, shortness of breath and wheezing.    Cardiovascular:  Negative for chest pain and palpitations.   Gastrointestinal:  Negative for abdominal distention.   Genitourinary:  Negative for dysuria.   Musculoskeletal:  Negative for arthralgias, joint swelling and myalgias.   Neurological:  Negative for syncope and headaches.   Hematological:  Does not bruise/bleed easily.   Psychiatric/Behavioral:  Negative for confusion and decreased concentration.      Past Surgical History:   Procedure Laterality Date    COLONOSCOPY  09/07/2022    ESOPHAGOGASTRODUODENOSCOPY  09/07/2022    FRACTURE SURGERY      LEFT ARM    IR TIPS      10/9/2022    LAPAROSCOPIC CHOLECYSTECTOMY  11/18/2022     Current Outpatient Medications   Medication Sig    cholecalciferol, vitamin D3, 100 mcg (4,000 unit) Cap capsule Take 1 capsule (4,000 Units total) by mouth once daily.     EScitalopram oxalate (LEXAPRO) 20 MG tablet Take 20 mg by mouth once daily.    flash glucose sensor (FREESTYLE HALLIE 14 DAY SENSOR) Kit 1 kit by Misc.(Non-Drug; Combo Route) route every 14 (fourteen) days.    lactulose (CHRONULAC) 10 gram/15 mL solution Take 20 g by mouth 3 (three) times daily.    losartan (COZAAR) 50 MG tablet Take 50 mg by mouth every morning.    metFORMIN (GLUCOPHAGE) 1000 MG tablet Take 1,000 mg by mouth 2 (two) times daily.    metoprolol tartrate (LOPRESSOR) 25 MG tablet Take 12.5 mg by mouth 2 (two) times daily.    pantoprazole (PROTONIX) 40 MG tablet Take 40 mg by mouth 2 (two) times daily.    rosuvastatin (CRESTOR) 5 MG tablet Take 5 mg by mouth every morning.    spironolactone (ALDACTONE) 50 MG tablet Take 50 mg by mouth once daily.    XIFAXAN 550 mg Tab Take 550 mg by mouth 2 (two) times daily.     No current facility-administered medications for this visit.       Objective:   Physical Exam  Constitutional:       Appearance: He is well-developed.   Eyes:      General: No scleral icterus.  Cardiovascular:      Rate and Rhythm: Normal rate and regular rhythm.      Heart sounds: Normal heart sounds.   Pulmonary:      Effort: Pulmonary effort is normal. No respiratory distress.      Breath sounds: Normal breath sounds. No wheezing.   Abdominal:      General: Bowel sounds are normal. There is no distension.      Palpations: Abdomen is soft. There is no mass.      Tenderness: There is no abdominal tenderness. There is no rebound.   Musculoskeletal:         General: Normal range of motion.   Lymphadenopathy:      Cervical: No cervical adenopathy.   Skin:     General: Skin is warm and dry.   Neurological:      Mental Status: He is alert and oriented to person, place, and time.       MELD 3.0: 11 at 6/22/2023  8:30 AM  MELD-Na: 11 at 6/22/2023  8:30 AM  Calculated from:  Serum Creatinine: 0.8 mg/dL (Using min of 1 mg/dL) at 6/22/2023  8:30 AM  Serum Sodium: 142 mmol/L (Using max of 137 mmol/L) at  6/22/2023  8:30 AM  Total Bilirubin: 1.6 mg/dL at 6/22/2023  8:30 AM  Serum Albumin: 3.5 g/dL at 6/22/2023  8:30 AM  INR(ratio): 1.3 at 6/22/2023  8:30 AM  Age at listing (hypothetical): 42 years  Sex: Male at 6/22/2023  8:30 AM    Lab Results   Component Value Date     (H) 06/22/2023    BUN 12 06/22/2023    CREATININE 0.8 06/22/2023    CALCIUM 8.7 06/22/2023     06/22/2023    K 4.6 06/22/2023     06/22/2023    PROT 6.2 06/22/2023    CO2 26 06/22/2023    WBC 1.50 (LL) 06/22/2023    RBC 3.77 (L) 06/22/2023    HGB 9.3 (L) 06/22/2023    HCT 30.3 (L) 06/22/2023    PLT 56 (L) 06/22/2023     Lab Results   Component Value Date    CHOL 122 02/28/2023    TRIG 115 02/28/2023    HDL 42 02/28/2023    CHOLHDL 34.4 02/28/2023    TOTALCHOLEST 2.9 02/28/2023    ALBUMIN 3.5 06/22/2023    ALBUMIN 4.0 02/28/2023    BILITOT 1.6 (H) 06/22/2023    AST 21 06/22/2023    ALT 15 06/22/2023    ALKPHOS 106 06/22/2023    LABPROT 13.5 (H) 06/22/2023    INR 1.3 (H) 06/22/2023       Diagnostics:     1. Hepatic cirrhosis, unspecified hepatic cirrhosis type, unspecified whether ascites present    2. Organ transplant candidate    3. Liver cirrhosis secondary to AMEZCUA    4. S/P TIPS (transjugular intrahepatic portosystemic shunt)    5. Secondary esophageal varices without bleeding    6. Gastric varices        Transplant Hepatology - Candidacy   Assessment/Plan:     Transplant Candidacy: Michael Urbano is a 43 y.o. male with ESLD secondary to nonalcoholic steatohepatitis .  He is currently too early for liver transplantation.   I will have him return to clinic in 6 months' time to monitor his indications for transplantation.  MD ABIGAIL HicksOS Patient Status  Functional Status: 90% - Able to carry on normal activity: minor symptoms of disease  Physical Capacity: No Limitations    Patient on life support: No  Diabetes: Type II  Any previous malignancy: No  Neoadjuvant Therapy: no  Has patient ever had a dx of HCC:  yes  Previous Abdominal Surgery: no  Spontaneous Bacterial Peritonitis: no  History of Portal Vein Thrombosis: no  Transjugular Intrahepatic Portosystemic Shunt: yes

## 2024-01-02 NOTE — LETTER
January 2, 2024        Alexi Leonardo  [address] [place], [place] [address] [place] [address]  Phone: [phone]  Fax: [phone]             [facility]  [address]  [facility] [address]  Phone: [phone]   Patient: Michael Urbano   MR Number: [id number]   YOB: 1980   Date of Visit: 1/2/2024       Dear Dr. Alexi Leonardo    Thank you for referring Michael Urbano to me for evaluation. Attached you will find relevant portions of my assessment and plan of care.    If you have questions, please do not hesitate to call me. I look forward to following Michael Urbano along with you.    Sincerely,    Travis Rivas MD    Enclosure    If you would like to receive this communication electronically, please contact [email or web address] or [phone] to request [employer] access.    [employer] is a tool which provides read-only access to select patient information with whom you have a relationship. Its easy to use and provides real time access to review your patients record including encounter summaries, notes, results, and demographic information.    If you feel you have received this communication in error or would no longer like to receive these types of communications, please e-mail [email or web address]

## 2024-01-09 NOTE — TELEPHONE ENCOUNTER
Patient informed of recommendation for Hematology consult for leukopenia.  He voiced understanding.  Per his request, referral sent to [facility].  Medical records and demographics faxed.

## 2024-01-22 ENCOUNTER — TELEPHONE (OUTPATIENT)
Dept: TRANSPLANT | Facility: CLINIC | Age: 44
End: 2024-01-22
Payer: COMMERCIAL

## 2024-01-22 NOTE — TELEPHONE ENCOUNTER
Liver Transplant Committee Discussion     Patient Name: Michael Urbano   : 1980  MRN: [id number]    Requested by: Travis Rivas MD    Day to be discussed: Liver discussion days: Wednesday     Transplant Coordinator: Liver Coordinators: Karin Forte    Patient Status: outpatient    Transplant Status: transplant status: In Eval--DEFERRED    Reason for Discussion:  Patient evaluated and deferred 2023 due to low MELD.  Patient remains stable w/ low MELD score.  Discuss closing transplant episode.    Plan:    Route to:

## 2024-01-24 ENCOUNTER — CONFERENCE (OUTPATIENT)
Dept: TRANSPLANT | Facility: CLINIC | Age: 44
End: 2024-01-24
Payer: COMMERCIAL

## 2024-01-24 NOTE — TELEPHONE ENCOUNTER
Pt's case discussed in the liver committee meeting. Has been deferred for a while w/ plans to reassess in 6 months.  Likely referred to early. Per committee ok to close pt's episode. Pt can follow up in hepatology.

## 2024-01-26 NOTE — TELEPHONE ENCOUNTER
Patient seen in transplant clinic recently.  Liver disease stable.  Pt remains too early for transplant.  Discussed in the liver meeting.  Transplant episode closed.  Patient notified. He voiced understanding that he will be followed in the Hepatology clinic.  6 month recall and orders entered (for labs, u/s and clinic appt).  Snapshot updated.  Documentation complete.

## 2024-04-23 ENCOUNTER — PATIENT MESSAGE (OUTPATIENT)
Dept: TRANSPLANT | Facility: CLINIC | Age: 44
End: 2024-04-23
Payer: COMMERCIAL

## 2024-07-01 ENCOUNTER — OFFICE VISIT (OUTPATIENT)
Dept: HEPATOLOGY | Facility: CLINIC | Age: 44
End: 2024-07-01
Attending: INTERNAL MEDICINE
Payer: COMMERCIAL

## 2024-07-01 VITALS
BODY MASS INDEX: 29.94 KG/M2 | HEART RATE: 78 BPM | RESPIRATION RATE: 18 BRPM | DIASTOLIC BLOOD PRESSURE: 63 MMHG | HEIGHT: 66 IN | WEIGHT: 186.31 LBS | OXYGEN SATURATION: 98 % | TEMPERATURE: 96 F | SYSTOLIC BLOOD PRESSURE: 113 MMHG

## 2024-07-01 DIAGNOSIS — K75.81 LIVER CIRRHOSIS SECONDARY TO NASH: Primary | Chronic | ICD-10-CM

## 2024-07-01 DIAGNOSIS — K76.0 METABOLIC DYSFUNCTION-ASSOCIATED STEATOTIC LIVER DISEASE (MASLD): Chronic | ICD-10-CM

## 2024-07-01 DIAGNOSIS — K74.60 LIVER CIRRHOSIS SECONDARY TO NASH: Primary | Chronic | ICD-10-CM

## 2024-07-01 PROCEDURE — 1160F RVW MEDS BY RX/DR IN RCRD: CPT | Mod: CPTII,S$GLB,, | Performed by: INTERNAL MEDICINE

## 2024-07-01 PROCEDURE — 3044F HG A1C LEVEL LT 7.0%: CPT | Mod: CPTII,S$GLB,, | Performed by: INTERNAL MEDICINE

## 2024-07-01 PROCEDURE — 99213 OFFICE O/P EST LOW 20 MIN: CPT | Mod: S$GLB,,, | Performed by: INTERNAL MEDICINE

## 2024-07-01 PROCEDURE — 3074F SYST BP LT 130 MM HG: CPT | Mod: CPTII,S$GLB,, | Performed by: INTERNAL MEDICINE

## 2024-07-01 PROCEDURE — 3008F BODY MASS INDEX DOCD: CPT | Mod: CPTII,S$GLB,, | Performed by: INTERNAL MEDICINE

## 2024-07-01 PROCEDURE — 99999 PR PBB SHADOW E&M-EST. PATIENT-LVL IV: CPT | Mod: PBBFAC,,, | Performed by: INTERNAL MEDICINE

## 2024-07-01 PROCEDURE — 4010F ACE/ARB THERAPY RXD/TAKEN: CPT | Mod: CPTII,S$GLB,, | Performed by: INTERNAL MEDICINE

## 2024-07-01 PROCEDURE — 3078F DIAST BP <80 MM HG: CPT | Mod: CPTII,S$GLB,, | Performed by: INTERNAL MEDICINE

## 2024-07-01 PROCEDURE — 1159F MED LIST DOCD IN RCRD: CPT | Mod: CPTII,S$GLB,, | Performed by: INTERNAL MEDICINE

## 2024-07-01 NOTE — PROGRESS NOTES
Hepatology      Appointment started: 7/1/2024 at 9:57 AM     Original Referring Provider: Alexi Leonardo  Current Corresponding Physician: Alexi Leonardo      Reason for Visit: cirrhosis, TIPS follow up     Subjective:     Michael Urbano is a 43 y.o. male with ESLD secondary to  Metabolic dysfunction associated steatotic liver disease.  He had been referred for transplant but eventually underwent a tips insertion for portal hypertension and no longer has indications for transplantation.  In May underwent a tips revision with a satisfactory follow-up Doppler ultrasound.  He has a mildly elevated bilirubin but otherwise preserved liver synthetic function.  He has no hepatic encephalopathy.  He has no symptoms of chronic liver disease.  Review of Systems   Constitutional:  Negative for activity change, appetite change, chills, fatigue and unexpected weight change.   HENT:  Negative for congestion, facial swelling and tinnitus.    Eyes:  Negative for visual disturbance.   Respiratory:  Negative for cough, shortness of breath and wheezing.    Cardiovascular:  Negative for chest pain and palpitations.   Gastrointestinal:  Negative for abdominal distention.   Genitourinary:  Negative for dysuria.   Musculoskeletal:  Negative for arthralgias, joint swelling and myalgias.   Neurological:  Negative for syncope and headaches.   Hematological:  Does not bruise/bleed easily.   Psychiatric/Behavioral:  Negative for confusion and decreased concentration.      Past Surgical History:   Procedure Laterality Date    COLONOSCOPY  09/07/2022    ESOPHAGOGASTRODUODENOSCOPY  09/07/2022    FRACTURE SURGERY      LEFT ARM    IR TIPS      10/9/2022    LAPAROSCOPIC CHOLECYSTECTOMY  11/18/2022     Current Outpatient Medications   Medication Sig    EScitalopram oxalate (LEXAPRO) 20 MG tablet Take 20 mg by mouth once daily.    flash glucose sensor (FREESTYLE HALLIE 14 DAY SENSOR) Kit 1 kit by Misc.(Non-Drug; Combo Route) route every  14 (fourteen) days.    lactulose (CHRONULAC) 10 gram/15 mL solution Take 20 g by mouth 3 (three) times daily.    losartan (COZAAR) 50 MG tablet Take 50 mg by mouth every morning.    metFORMIN (GLUCOPHAGE) 1000 MG tablet Take 1,000 mg by mouth 2 (two) times daily.    metoprolol tartrate (LOPRESSOR) 25 MG tablet Take 12.5 mg by mouth 2 (two) times daily.    pantoprazole (PROTONIX) 40 MG tablet Take 40 mg by mouth 2 (two) times daily.    rosuvastatin (CRESTOR) 5 MG tablet Take 5 mg by mouth every morning.    spironolactone (ALDACTONE) 50 MG tablet Take 50 mg by mouth once daily.    XIFAXAN 550 mg Tab Take 550 mg by mouth 2 (two) times daily.     No current facility-administered medications for this visit.       Objective:   Physical Exam  Constitutional:       Appearance: He is well-developed.   Eyes:      General: No scleral icterus.  Cardiovascular:      Rate and Rhythm: Normal rate and regular rhythm.      Heart sounds: Normal heart sounds.   Pulmonary:      Effort: Pulmonary effort is normal. No respiratory distress.      Breath sounds: Normal breath sounds. No wheezing.   Abdominal:      General: Bowel sounds are normal. There is no distension.      Palpations: Abdomen is soft. There is no mass.      Tenderness: There is no abdominal tenderness. There is no rebound.   Musculoskeletal:         General: Normal range of motion.   Lymphadenopathy:      Cervical: No cervical adenopathy.   Skin:     General: Skin is warm and dry.   Neurological:      Mental Status: He is alert and oriented to person, place, and time.       MELD 3.0: 12 at 1/2/2024  9:14 AM  MELD-Na: 12 at 1/2/2024  9:14 AM  Calculated from:  Serum Creatinine: 0.8 mg/dL (Using min of 1 mg/dL) at 1/2/2024  9:14 AM  Serum Sodium: 142 mmol/L (Using max of 137 mmol/L) at 1/2/2024  9:14 AM  Total Bilirubin: 2.0 mg/dL at 1/2/2024  9:14 AM  Serum Albumin: 3.8 g/dL (Using max of 3.5 g/dL) at 1/2/2024  9:14 AM  INR(ratio): 1.3 at 1/2/2024  9:14 AM  Age at listing  (hypothetical): 43 years  Sex: Male at 1/2/2024  9:14 AM    Lab Results   Component Value Date     01/02/2024    BUN 13 01/02/2024    CREATININE 0.8 01/02/2024    CALCIUM 8.8 01/02/2024     01/02/2024    K 4.2 01/02/2024     01/02/2024    PROT 6.5 01/02/2024    CO2 24 01/02/2024    WBC 1.25 (LL) 01/02/2024    RBC 3.88 (L) 01/02/2024    HGB 8.0 (L) 01/02/2024    HCT 26.8 (L) 01/02/2024    PLT 40 (LL) 05/03/2024    PLT 59 (L) 01/02/2024     Lab Results   Component Value Date    CHOL 122 02/28/2023    TRIG 115 02/28/2023    HDL 42 02/28/2023    CHOLHDL 34.4 02/28/2023    TOTALCHOLEST 2.9 02/28/2023    ALBUMIN 3.8 01/02/2024    ALBUMIN 4.0 02/28/2023    BILITOT 2.0 (H) 01/02/2024    AST 24 01/02/2024    ALT 18 01/02/2024    ALKPHOS 111 01/02/2024    LABPROT 13.5 (H) 01/02/2024    INR 1.3 (H) 01/02/2024       Diagnostics:     1. Liver cirrhosis secondary to AMEZCUA    2. Metabolic dysfunction-associated steatotic liver disease (MASLD)        Hepatology  Assessment/Plan:       Impression:    Cirrhosis secondary to metabolic dysfunction associated steatotic liver disease status post tips revision with no indications for transplantation.    Plan:    I will continue to monitor his indications for transplantation.  He will return to clinic in 6 months time with continued clinical, biochemical and Doppler ultrasound surveillance.

## 2024-07-13 ENCOUNTER — HOSPITAL ENCOUNTER (OUTPATIENT)
Dept: RADIOLOGY | Facility: HOSPITAL | Age: 44
Discharge: HOME | End: 2024-07-13
Attending: INTERNAL MEDICINE
Payer: COMMERCIAL

## 2024-07-13 DIAGNOSIS — K76.0 METABOLIC DYSFUNCTION-ASSOCIATED STEATOTIC LIVER DISEASE (MASLD): Chronic | ICD-10-CM

## 2024-07-13 DIAGNOSIS — K74.60 LIVER CIRRHOSIS SECONDARY TO NASH: Chronic | ICD-10-CM

## 2024-07-13 DIAGNOSIS — K75.81 LIVER CIRRHOSIS SECONDARY TO NASH: Chronic | ICD-10-CM

## 2024-07-13 PROCEDURE — 93976 VASCULAR STUDY: CPT | Mod: TC

## 2024-07-13 PROCEDURE — 76705 ECHO EXAM OF ABDOMEN: CPT | Mod: TC,59

## 2024-08-04 ENCOUNTER — HOSPITAL ENCOUNTER (OUTPATIENT)
Dept: RADIOLOGY | Facility: HOSPITAL | Age: 44
Discharge: HOME | End: 2024-08-04
Attending: INTERNAL MEDICINE
Payer: COMMERCIAL

## 2024-08-04 DIAGNOSIS — K75.81 LIVER CIRRHOSIS SECONDARY TO NASH: Chronic | ICD-10-CM

## 2024-08-04 DIAGNOSIS — K74.60 LIVER CIRRHOSIS SECONDARY TO NASH: Chronic | ICD-10-CM

## 2024-08-04 DIAGNOSIS — K76.0 METABOLIC DYSFUNCTION-ASSOCIATED STEATOTIC LIVER DISEASE (MASLD): Chronic | ICD-10-CM

## 2024-08-04 PROCEDURE — 93976 VASCULAR STUDY: CPT | Mod: TC

## 2024-08-04 PROCEDURE — 76705 ECHO EXAM OF ABDOMEN: CPT | Mod: TC,59

## 2024-11-12 ENCOUNTER — PATIENT MESSAGE (OUTPATIENT)
Dept: HEPATOLOGY | Facility: CLINIC | Age: 44
End: 2024-11-12
Payer: COMMERCIAL

## 2024-12-23 ENCOUNTER — OFFICE VISIT (OUTPATIENT)
Dept: HEPATOLOGY | Facility: CLINIC | Age: 44
End: 2024-12-23
Attending: INTERNAL MEDICINE
Payer: COMMERCIAL

## 2024-12-23 ENCOUNTER — HOSPITAL ENCOUNTER (OUTPATIENT)
Dept: RADIOLOGY | Facility: HOSPITAL | Age: 44
Discharge: HOME | End: 2024-12-23
Attending: INTERNAL MEDICINE
Payer: COMMERCIAL

## 2024-12-23 VITALS
RESPIRATION RATE: 18 BRPM | BODY MASS INDEX: 30.97 KG/M2 | WEIGHT: 192.69 LBS | SYSTOLIC BLOOD PRESSURE: 133 MMHG | HEART RATE: 98 BPM | DIASTOLIC BLOOD PRESSURE: 70 MMHG | HEIGHT: 66 IN | OXYGEN SATURATION: 98 % | TEMPERATURE: 96 F

## 2024-12-23 DIAGNOSIS — K76.0 METABOLIC DYSFUNCTION-ASSOCIATED STEATOTIC LIVER DISEASE (MASLD): Chronic | ICD-10-CM

## 2024-12-23 DIAGNOSIS — K75.81 LIVER CIRRHOSIS SECONDARY TO NASH: Chronic | ICD-10-CM

## 2024-12-23 DIAGNOSIS — K74.60 LIVER CIRRHOSIS SECONDARY TO NASH: Chronic | ICD-10-CM

## 2024-12-23 DIAGNOSIS — Z95.828 S/P TIPS (TRANSJUGULAR INTRAHEPATIC PORTOSYSTEMIC SHUNT): Primary | Chronic | ICD-10-CM

## 2024-12-23 PROCEDURE — 99213 OFFICE O/P EST LOW 20 MIN: CPT | Mod: S$GLB,,, | Performed by: INTERNAL MEDICINE

## 2024-12-23 PROCEDURE — 1159F MED LIST DOCD IN RCRD: CPT | Mod: CPTII,S$GLB,, | Performed by: INTERNAL MEDICINE

## 2024-12-23 PROCEDURE — 1160F RVW MEDS BY RX/DR IN RCRD: CPT | Mod: CPTII,S$GLB,, | Performed by: INTERNAL MEDICINE

## 2024-12-23 PROCEDURE — 3044F HG A1C LEVEL LT 7.0%: CPT | Mod: CPTII,S$GLB,, | Performed by: INTERNAL MEDICINE

## 2024-12-23 PROCEDURE — 99999 PR PBB SHADOW E&M-EST. PATIENT-LVL IV: CPT | Mod: PBBFAC,,, | Performed by: INTERNAL MEDICINE

## 2024-12-23 PROCEDURE — 3008F BODY MASS INDEX DOCD: CPT | Mod: CPTII,S$GLB,, | Performed by: INTERNAL MEDICINE

## 2024-12-23 PROCEDURE — 76705 ECHO EXAM OF ABDOMEN: CPT | Mod: TC

## 2024-12-23 PROCEDURE — 4010F ACE/ARB THERAPY RXD/TAKEN: CPT | Mod: CPTII,S$GLB,, | Performed by: INTERNAL MEDICINE

## 2024-12-23 PROCEDURE — 3075F SYST BP GE 130 - 139MM HG: CPT | Mod: CPTII,S$GLB,, | Performed by: INTERNAL MEDICINE

## 2024-12-23 PROCEDURE — 3078F DIAST BP <80 MM HG: CPT | Mod: CPTII,S$GLB,, | Performed by: INTERNAL MEDICINE

## 2024-12-23 RX ORDER — NICOTINE 11MG/24HR
4000 PATCH, TRANSDERMAL 24 HOURS TRANSDERMAL EVERY MORNING
COMMUNITY
Start: 2024-11-21

## 2024-12-23 NOTE — PROGRESS NOTES
Hepatology      Appointment started: 12/23/2024 at 9:57 AM     Original Referring Provider: Alexi Leonardo  Current Corresponding Physician: Alexi Leonardo      Reason for Visit: cirrhosis, TIPS follow up     Subjective:     Michael Urbano is a 44 y.o. male with ESLD secondary to  Metabolic dysfunction associated steatotic liver disease.  He had been referred for transplant but eventually underwent a tips insertion for portal hypertension and no longer has indications for transplantation.  In May underwent a tips revision with a satisfactory follow-up Doppler ultrasound.  He has blood work and an ultrasound Doppler today the results of which are pending.  He has no symptoms of chronic liver disease.  His hepatic encephalopathy is well-controlled on Xifaxan and lactulose.    Review of Systems   Constitutional:  Negative for activity change, appetite change, chills, fatigue and unexpected weight change.   HENT:  Negative for congestion, facial swelling and tinnitus.    Eyes:  Negative for visual disturbance.   Respiratory:  Negative for cough, shortness of breath and wheezing.    Cardiovascular:  Negative for chest pain and palpitations.   Gastrointestinal:  Negative for abdominal distention.   Genitourinary:  Negative for dysuria.   Musculoskeletal:  Negative for arthralgias, joint swelling and myalgias.   Neurological:  Negative for syncope and headaches.   Hematological:  Does not bruise/bleed easily.   Psychiatric/Behavioral:  Negative for confusion and decreased concentration.      Past Surgical History:   Procedure Laterality Date    COLONOSCOPY  09/07/2022    ESOPHAGOGASTRODUODENOSCOPY  09/07/2022    FRACTURE SURGERY      LEFT ARM    IR TIPS      10/9/2022    LAPAROSCOPIC CHOLECYSTECTOMY  11/18/2022     Current Outpatient Medications   Medication Sig    EScitalopram oxalate (LEXAPRO) 20 MG tablet Take 20 mg by mouth once daily.    flash glucose sensor (FREESTYLE HALLIE 14 DAY SENSOR) Kit 1 kit by  Misc.(Non-Drug; Combo Route) route every 14 (fourteen) days.    lactulose (CHRONULAC) 10 gram/15 mL solution Take 20 g by mouth 3 (three) times daily.    losartan (COZAAR) 50 MG tablet Take 50 mg by mouth every morning.    metFORMIN (GLUCOPHAGE) 1000 MG tablet Take 1,000 mg by mouth 2 (two) times daily.    metoprolol tartrate (LOPRESSOR) 25 MG tablet Take 12.5 mg by mouth 2 (two) times daily.    pantoprazole (PROTONIX) 40 MG tablet Take 40 mg by mouth 2 (two) times daily.    rosuvastatin (CRESTOR) 5 MG tablet Take 5 mg by mouth every morning.    spironolactone (ALDACTONE) 50 MG tablet Take 50 mg by mouth once daily.    VITAMIN D3 50 mcg (2,000 unit) Cap capsule Take 4,000 Units by mouth every morning.    XIFAXAN 550 mg Tab Take 550 mg by mouth 2 (two) times daily.     No current facility-administered medications for this visit.       Objective:   Physical Exam  Constitutional:       Appearance: He is well-developed.   Eyes:      General: No scleral icterus.  Cardiovascular:      Rate and Rhythm: Normal rate and regular rhythm.      Heart sounds: Normal heart sounds.   Pulmonary:      Effort: Pulmonary effort is normal. No respiratory distress.      Breath sounds: Normal breath sounds. No wheezing.   Abdominal:      General: Bowel sounds are normal. There is no distension.      Palpations: Abdomen is soft. There is no mass.      Tenderness: There is no abdominal tenderness. There is no rebound.   Musculoskeletal:         General: Normal range of motion.   Lymphadenopathy:      Cervical: No cervical adenopathy.   Skin:     General: Skin is warm and dry.   Neurological:      Mental Status: He is alert and oriented to person, place, and time.       MELD 3.0: 12 at 1/2/2024  9:14 AM  MELD-Na: 12 at 1/2/2024  9:14 AM  Calculated from:  Serum Creatinine: 0.8 mg/dL (Using min of 1 mg/dL) at 1/2/2024  9:14 AM  Serum Sodium: 142 mmol/L (Using max of 137 mmol/L) at 1/2/2024  9:14 AM  Total Bilirubin: 2 mg/dL at 1/2/2024  9:14  AM  Serum Albumin: 3.8 g/dL (Using max of 3.5 g/dL) at 1/2/2024  9:14 AM  INR(ratio): 1.3 at 1/2/2024  9:14 AM  Age at listing (hypothetical): 43 years  Sex: Male at 1/2/2024  9:14 AM    Lab Results   Component Value Date     01/02/2024    BUN 13 01/02/2024    CREATININE 0.8 01/02/2024    CALCIUM 8.8 01/02/2024     01/02/2024    K 4.2 01/02/2024     01/02/2024    PROT 6.5 01/02/2024    CO2 24 01/02/2024    WBC 1.25 (LL) 01/02/2024    RBC 3.88 (L) 01/02/2024    HGB 8.0 (L) 01/02/2024    HCT 26.8 (L) 01/02/2024    PLT 40 (LL) 05/03/2024    PLT 59 (L) 01/02/2024     Lab Results   Component Value Date    CHOL 122 02/28/2023    TRIG 115 02/28/2023    HDL 42 02/28/2023    CHOLHDL 34.4 02/28/2023    TOTALCHOLEST 2.9 02/28/2023    ALBUMIN 3.8 01/02/2024    ALBUMIN 4.0 02/28/2023    BILITOT 2.0 (H) 01/02/2024    AST 24 01/02/2024    ALT 18 01/02/2024    ALKPHOS 111 01/02/2024    LABPROT 13.5 (H) 01/02/2024    INR 1.3 (H) 01/02/2024       Diagnostics:     1. S/P TIPS (transjugular intrahepatic portosystemic shunt)    2. Liver cirrhosis secondary to AMEZCUA        Hepatology  Assessment/Plan:       Impression:   Cirrhosis secondary to metabolic dysfunction associated steatotic liver disease status post tips revision with no indications for transplantation.    Plan:  He will return to clinic in 6 months time with continued clinical, biochemical and Doppler ultrasound surveillance.

## 2025-05-13 ENCOUNTER — PATIENT MESSAGE (OUTPATIENT)
Dept: HEPATOLOGY | Facility: CLINIC | Age: 45
End: 2025-05-13
Payer: COMMERCIAL

## 2025-07-15 ENCOUNTER — TELEPHONE (OUTPATIENT)
Dept: HEPATOLOGY | Facility: CLINIC | Age: 45
End: 2025-07-15
Payer: COMMERCIAL

## 2025-07-15 ENCOUNTER — PATIENT MESSAGE (OUTPATIENT)
Dept: HEPATOLOGY | Facility: CLINIC | Age: 45
End: 2025-07-15
Payer: COMMERCIAL

## 2025-07-15 NOTE — TELEPHONE ENCOUNTER
Contacted the pt and requesting to reschedule the U/S doppler and labs in [place].Scheduled on 7/16/2025.Follow up also has been rescheduled on 8//11/2025 at 4;00 pm.Pt glad for changes of his appt.  
no concerns

## 2025-07-16 ENCOUNTER — HOSPITAL ENCOUNTER (OUTPATIENT)
Dept: RADIOLOGY | Facility: HOSPITAL | Age: 45
Discharge: HOME | End: 2025-07-16
Attending: INTERNAL MEDICINE
Payer: COMMERCIAL

## 2025-07-16 DIAGNOSIS — Z95.828 S/P TIPS (TRANSJUGULAR INTRAHEPATIC PORTOSYSTEMIC SHUNT): Chronic | ICD-10-CM

## 2025-07-16 DIAGNOSIS — K74.60 LIVER CIRRHOSIS SECONDARY TO NASH: Chronic | ICD-10-CM

## 2025-07-16 DIAGNOSIS — K75.81 LIVER CIRRHOSIS SECONDARY TO NASH: Chronic | ICD-10-CM

## 2025-07-16 PROCEDURE — 93976 VASCULAR STUDY: CPT | Mod: TC,PO

## 2025-08-11 ENCOUNTER — OFFICE VISIT (OUTPATIENT)
Dept: HEPATOLOGY | Facility: CLINIC | Age: 45
End: 2025-08-11
Attending: INTERNAL MEDICINE
Payer: COMMERCIAL

## 2025-08-11 VITALS
BODY MASS INDEX: 30.82 KG/M2 | TEMPERATURE: 98 F | WEIGHT: 191.81 LBS | DIASTOLIC BLOOD PRESSURE: 62 MMHG | OXYGEN SATURATION: 97 % | HEIGHT: 66 IN | RESPIRATION RATE: 18 BRPM | SYSTOLIC BLOOD PRESSURE: 125 MMHG | HEART RATE: 92 BPM

## 2025-08-11 DIAGNOSIS — Z95.828 S/P TIPS (TRANSJUGULAR INTRAHEPATIC PORTOSYSTEMIC SHUNT): Chronic | ICD-10-CM

## 2025-08-11 DIAGNOSIS — K76.0 METABOLIC DYSFUNCTION-ASSOCIATED STEATOTIC LIVER DISEASE (MASLD): Primary | Chronic | ICD-10-CM

## 2025-08-11 DIAGNOSIS — K74.60 LIVER CIRRHOSIS SECONDARY TO NASH: Chronic | ICD-10-CM

## 2025-08-11 DIAGNOSIS — K75.81 LIVER CIRRHOSIS SECONDARY TO NASH: Chronic | ICD-10-CM

## 2025-08-11 PROCEDURE — 1160F RVW MEDS BY RX/DR IN RCRD: CPT | Mod: CPTII,S$GLB,, | Performed by: INTERNAL MEDICINE

## 2025-08-11 PROCEDURE — 4010F ACE/ARB THERAPY RXD/TAKEN: CPT | Mod: CPTII,S$GLB,, | Performed by: INTERNAL MEDICINE

## 2025-08-11 PROCEDURE — 99213 OFFICE O/P EST LOW 20 MIN: CPT | Mod: S$GLB,,, | Performed by: INTERNAL MEDICINE

## 2025-08-11 PROCEDURE — 3078F DIAST BP <80 MM HG: CPT | Mod: CPTII,S$GLB,, | Performed by: INTERNAL MEDICINE

## 2025-08-11 PROCEDURE — 99999 PR PBB SHADOW E&M-EST. PATIENT-LVL V: CPT | Mod: PBBFAC,,, | Performed by: INTERNAL MEDICINE

## 2025-08-11 PROCEDURE — 3044F HG A1C LEVEL LT 7.0%: CPT | Mod: CPTII,S$GLB,, | Performed by: INTERNAL MEDICINE

## 2025-08-11 PROCEDURE — 3008F BODY MASS INDEX DOCD: CPT | Mod: CPTII,S$GLB,, | Performed by: INTERNAL MEDICINE

## 2025-08-11 PROCEDURE — 1159F MED LIST DOCD IN RCRD: CPT | Mod: CPTII,S$GLB,, | Performed by: INTERNAL MEDICINE

## 2025-08-11 PROCEDURE — 3074F SYST BP LT 130 MM HG: CPT | Mod: CPTII,S$GLB,, | Performed by: INTERNAL MEDICINE

## 2025-08-11 RX ORDER — FUROSEMIDE 20 MG/1
20 TABLET ORAL
COMMUNITY

## 2025-08-11 RX ORDER — BLOOD-GLUCOSE SENSOR
EACH MISCELLANEOUS
COMMUNITY
Start: 2025-07-25